# Patient Record
Sex: MALE | Race: WHITE | NOT HISPANIC OR LATINO | Employment: OTHER | URBAN - METROPOLITAN AREA
[De-identification: names, ages, dates, MRNs, and addresses within clinical notes are randomized per-mention and may not be internally consistent; named-entity substitution may affect disease eponyms.]

---

## 2017-08-01 ENCOUNTER — RECORDS - HEALTHEAST (OUTPATIENT)
Dept: LAB | Facility: CLINIC | Age: 67
End: 2017-08-01

## 2017-08-01 LAB
CHOLEST SERPL-MCNC: 162 MG/DL
FASTING STATUS PATIENT QL REPORTED: ABNORMAL
HDLC SERPL-MCNC: 39 MG/DL
LDLC SERPL CALC-MCNC: 75 MG/DL
TRIGL SERPL-MCNC: 240 MG/DL

## 2018-12-21 ENCOUNTER — RECORDS - HEALTHEAST (OUTPATIENT)
Dept: LAB | Facility: CLINIC | Age: 68
End: 2018-12-21

## 2018-12-21 LAB
ALBUMIN SERPL-MCNC: 3.9 G/DL (ref 3.5–5)
ALP SERPL-CCNC: 56 U/L (ref 45–120)
ALT SERPL W P-5'-P-CCNC: 34 U/L (ref 0–45)
ANION GAP SERPL CALCULATED.3IONS-SCNC: 10 MMOL/L (ref 5–18)
AST SERPL W P-5'-P-CCNC: 24 U/L (ref 0–40)
BILIRUB SERPL-MCNC: 0.9 MG/DL (ref 0–1)
BUN SERPL-MCNC: 14 MG/DL (ref 8–22)
CALCIUM SERPL-MCNC: 9.2 MG/DL (ref 8.5–10.5)
CHLORIDE BLD-SCNC: 101 MMOL/L (ref 98–107)
CHOLEST SERPL-MCNC: 221 MG/DL
CO2 SERPL-SCNC: 27 MMOL/L (ref 22–31)
CREAT SERPL-MCNC: 0.96 MG/DL (ref 0.7–1.3)
FASTING STATUS PATIENT QL REPORTED: ABNORMAL
GFR SERPL CREATININE-BSD FRML MDRD: >60 ML/MIN/1.73M2
GLUCOSE BLD-MCNC: 155 MG/DL (ref 70–125)
HDLC SERPL-MCNC: 41 MG/DL
LDLC SERPL CALC-MCNC: 141 MG/DL
POTASSIUM BLD-SCNC: 4.2 MMOL/L (ref 3.5–5)
PROT SERPL-MCNC: 6.6 G/DL (ref 6–8)
SODIUM SERPL-SCNC: 138 MMOL/L (ref 136–145)
TRIGL SERPL-MCNC: 194 MG/DL

## 2019-06-05 ENCOUNTER — RECORDS - HEALTHEAST (OUTPATIENT)
Dept: LAB | Facility: CLINIC | Age: 69
End: 2019-06-05

## 2019-06-05 LAB
ALBUMIN SERPL-MCNC: 3.8 G/DL (ref 3.5–5)
ANION GAP SERPL CALCULATED.3IONS-SCNC: 10 MMOL/L (ref 5–18)
BUN SERPL-MCNC: 15 MG/DL (ref 8–22)
CALCIUM SERPL-MCNC: 9.4 MG/DL (ref 8.5–10.5)
CHLORIDE BLD-SCNC: 101 MMOL/L (ref 98–107)
CHOLEST SERPL-MCNC: 178 MG/DL
CO2 SERPL-SCNC: 26 MMOL/L (ref 22–31)
CREAT SERPL-MCNC: 1.03 MG/DL (ref 0.7–1.3)
FASTING STATUS PATIENT QL REPORTED: ABNORMAL
GFR SERPL CREATININE-BSD FRML MDRD: >60 ML/MIN/1.73M2
GLUCOSE BLD-MCNC: 144 MG/DL (ref 70–125)
HDLC SERPL-MCNC: 42 MG/DL
LDLC SERPL CALC-MCNC: 96 MG/DL
PHOSPHATE SERPL-MCNC: 3.3 MG/DL (ref 2.5–4.5)
POTASSIUM BLD-SCNC: 4.1 MMOL/L (ref 3.5–5)
PSA SERPL-MCNC: 1.2 NG/ML (ref 0–4.5)
SODIUM SERPL-SCNC: 137 MMOL/L (ref 136–145)
TRIGL SERPL-MCNC: 198 MG/DL

## 2020-12-18 ENCOUNTER — RECORDS - HEALTHEAST (OUTPATIENT)
Dept: LAB | Facility: CLINIC | Age: 70
End: 2020-12-18

## 2020-12-18 LAB
ALBUMIN SERPL-MCNC: 3.7 G/DL (ref 3.5–5)
ALP SERPL-CCNC: 60 U/L (ref 45–120)
ALT SERPL W P-5'-P-CCNC: 18 U/L (ref 0–45)
ANION GAP SERPL CALCULATED.3IONS-SCNC: 8 MMOL/L (ref 5–18)
AST SERPL W P-5'-P-CCNC: 20 U/L (ref 0–40)
BILIRUB SERPL-MCNC: 0.7 MG/DL (ref 0–1)
BUN SERPL-MCNC: 10 MG/DL (ref 8–22)
CALCIUM SERPL-MCNC: 8.9 MG/DL (ref 8.5–10.5)
CHLORIDE BLD-SCNC: 101 MMOL/L (ref 98–107)
CHOLEST SERPL-MCNC: 163 MG/DL
CO2 SERPL-SCNC: 29 MMOL/L (ref 22–31)
CREAT SERPL-MCNC: 0.99 MG/DL (ref 0.7–1.3)
FASTING STATUS PATIENT QL REPORTED: ABNORMAL
GFR SERPL CREATININE-BSD FRML MDRD: >60 ML/MIN/1.73M2
GLUCOSE BLD-MCNC: 94 MG/DL (ref 70–125)
HDLC SERPL-MCNC: 39 MG/DL
LDLC SERPL CALC-MCNC: 88 MG/DL
POTASSIUM BLD-SCNC: 4.1 MMOL/L (ref 3.5–5)
PROT SERPL-MCNC: 6.5 G/DL (ref 6–8)
SODIUM SERPL-SCNC: 138 MMOL/L (ref 136–145)
TRIGL SERPL-MCNC: 182 MG/DL

## 2021-05-25 ENCOUNTER — RECORDS - HEALTHEAST (OUTPATIENT)
Dept: ADMINISTRATIVE | Facility: CLINIC | Age: 71
End: 2021-05-25

## 2021-06-01 ENCOUNTER — RECORDS - HEALTHEAST (OUTPATIENT)
Dept: ADMINISTRATIVE | Facility: CLINIC | Age: 71
End: 2021-06-01

## 2021-09-21 ENCOUNTER — LAB REQUISITION (OUTPATIENT)
Dept: LAB | Facility: CLINIC | Age: 71
End: 2021-09-21

## 2021-09-21 DIAGNOSIS — E78.2 MIXED HYPERLIPIDEMIA: ICD-10-CM

## 2021-09-21 DIAGNOSIS — I10 ESSENTIAL (PRIMARY) HYPERTENSION: ICD-10-CM

## 2021-09-21 LAB
ALBUMIN SERPL-MCNC: 3.5 G/DL (ref 3.5–5)
ALP SERPL-CCNC: 77 U/L (ref 45–120)
ALT SERPL W P-5'-P-CCNC: 12 U/L (ref 0–45)
ANION GAP SERPL CALCULATED.3IONS-SCNC: 10 MMOL/L (ref 5–18)
AST SERPL W P-5'-P-CCNC: 12 U/L (ref 0–40)
BILIRUB SERPL-MCNC: 0.7 MG/DL (ref 0–1)
BUN SERPL-MCNC: 14 MG/DL (ref 8–22)
CALCIUM SERPL-MCNC: 8.9 MG/DL (ref 8.5–10.5)
CHLORIDE BLD-SCNC: 101 MMOL/L (ref 98–107)
CHOLEST SERPL-MCNC: 150 MG/DL
CO2 SERPL-SCNC: 26 MMOL/L (ref 22–31)
CREAT SERPL-MCNC: 0.99 MG/DL (ref 0.7–1.3)
GFR SERPL CREATININE-BSD FRML MDRD: 77 ML/MIN/1.73M2
GLUCOSE BLD-MCNC: 201 MG/DL (ref 70–125)
HDLC SERPL-MCNC: 38 MG/DL
LDLC SERPL CALC-MCNC: 79 MG/DL
POTASSIUM BLD-SCNC: 4 MMOL/L (ref 3.5–5)
PROT SERPL-MCNC: 6.2 G/DL (ref 6–8)
SODIUM SERPL-SCNC: 137 MMOL/L (ref 136–145)
TRIGL SERPL-MCNC: 166 MG/DL

## 2021-09-21 PROCEDURE — 82040 ASSAY OF SERUM ALBUMIN: CPT | Performed by: PHYSICIAN ASSISTANT

## 2021-09-21 PROCEDURE — 80061 LIPID PANEL: CPT | Performed by: PHYSICIAN ASSISTANT

## 2023-03-20 ENCOUNTER — LAB REQUISITION (OUTPATIENT)
Dept: LAB | Facility: CLINIC | Age: 73
End: 2023-03-20

## 2023-03-20 DIAGNOSIS — E78.2 MIXED HYPERLIPIDEMIA: ICD-10-CM

## 2023-03-20 LAB
ALBUMIN SERPL BCG-MCNC: 3.5 G/DL (ref 3.5–5.2)
ALP SERPL-CCNC: 71 U/L (ref 40–129)
ALT SERPL W P-5'-P-CCNC: 8 U/L (ref 10–50)
ANION GAP SERPL CALCULATED.3IONS-SCNC: 11 MMOL/L (ref 7–15)
AST SERPL W P-5'-P-CCNC: 12 U/L (ref 10–50)
BILIRUB SERPL-MCNC: 0.4 MG/DL
BUN SERPL-MCNC: 16.4 MG/DL (ref 8–23)
CALCIUM SERPL-MCNC: 8.1 MG/DL (ref 8.8–10.2)
CHLORIDE SERPL-SCNC: 102 MMOL/L (ref 98–107)
CHOLEST SERPL-MCNC: 119 MG/DL
CREAT SERPL-MCNC: 1.36 MG/DL (ref 0.67–1.17)
DEPRECATED HCO3 PLAS-SCNC: 26 MMOL/L (ref 22–29)
GFR SERPL CREATININE-BSD FRML MDRD: 56 ML/MIN/1.73M2
GLUCOSE SERPL-MCNC: 129 MG/DL (ref 70–99)
HDLC SERPL-MCNC: 32 MG/DL
LDLC SERPL CALC-MCNC: 66 MG/DL
NONHDLC SERPL-MCNC: 87 MG/DL
POTASSIUM SERPL-SCNC: 4 MMOL/L (ref 3.4–5.3)
PROT SERPL-MCNC: 5.7 G/DL (ref 6.4–8.3)
SODIUM SERPL-SCNC: 139 MMOL/L (ref 136–145)
TRIGL SERPL-MCNC: 106 MG/DL

## 2023-03-20 PROCEDURE — 80061 LIPID PANEL: CPT | Performed by: PHYSICIAN ASSISTANT

## 2023-03-20 PROCEDURE — 80053 COMPREHEN METABOLIC PANEL: CPT | Performed by: PHYSICIAN ASSISTANT

## 2023-04-05 ENCOUNTER — LAB REQUISITION (OUTPATIENT)
Dept: LAB | Facility: CLINIC | Age: 73
End: 2023-04-05

## 2023-04-05 DIAGNOSIS — E83.51 HYPOCALCEMIA: ICD-10-CM

## 2023-04-05 LAB
MAGNESIUM SERPL-MCNC: 1.4 MG/DL (ref 1.7–2.3)
PHOSPHATE SERPL-MCNC: 3.7 MG/DL (ref 2.5–4.5)
PTH-INTACT SERPL-MCNC: 35 PG/ML (ref 15–65)
TSH SERPL DL<=0.005 MIU/L-ACNC: 3.94 UIU/ML (ref 0.3–4.2)

## 2023-04-05 PROCEDURE — 84443 ASSAY THYROID STIM HORMONE: CPT | Performed by: PHYSICIAN ASSISTANT

## 2023-04-05 PROCEDURE — 82306 VITAMIN D 25 HYDROXY: CPT | Performed by: PHYSICIAN ASSISTANT

## 2023-04-05 PROCEDURE — 83970 ASSAY OF PARATHORMONE: CPT | Performed by: PHYSICIAN ASSISTANT

## 2023-04-05 PROCEDURE — 84100 ASSAY OF PHOSPHORUS: CPT | Performed by: PHYSICIAN ASSISTANT

## 2023-04-05 PROCEDURE — 83735 ASSAY OF MAGNESIUM: CPT | Performed by: PHYSICIAN ASSISTANT

## 2023-04-06 LAB — DEPRECATED CALCIDIOL+CALCIFEROL SERPL-MC: 11 UG/L (ref 20–75)

## 2023-08-24 ENCOUNTER — LAB REQUISITION (OUTPATIENT)
Dept: LAB | Facility: CLINIC | Age: 73
End: 2023-08-24

## 2023-08-24 ENCOUNTER — TRANSFERRED RECORDS (OUTPATIENT)
Dept: HEALTH INFORMATION MANAGEMENT | Facility: CLINIC | Age: 73
End: 2023-08-24
Payer: COMMERCIAL

## 2023-08-24 DIAGNOSIS — C91.41 HAIRY CELL LEUKEMIA, IN REMISSION (H): ICD-10-CM

## 2023-08-24 DIAGNOSIS — R10.11 RIGHT UPPER QUADRANT PAIN: ICD-10-CM

## 2023-08-24 LAB
ALBUMIN SERPL BCG-MCNC: 3.4 G/DL (ref 3.5–5.2)
ALP SERPL-CCNC: 64 U/L (ref 40–129)
ALT SERPL W P-5'-P-CCNC: 6 U/L (ref 0–70)
ANION GAP SERPL CALCULATED.3IONS-SCNC: 13 MMOL/L (ref 7–15)
AST SERPL W P-5'-P-CCNC: 13 U/L (ref 0–45)
BASOPHILS # BLD AUTO: 0 10E3/UL (ref 0–0.2)
BASOPHILS NFR BLD AUTO: 0 %
BILIRUB SERPL-MCNC: 0.6 MG/DL
BUN SERPL-MCNC: 34.4 MG/DL (ref 8–23)
CALCIUM SERPL-MCNC: 8 MG/DL (ref 8.8–10.2)
CHLORIDE SERPL-SCNC: 98 MMOL/L (ref 98–107)
CREAT SERPL-MCNC: 1.32 MG/DL (ref 0.67–1.17)
DEPRECATED HCO3 PLAS-SCNC: 24 MMOL/L (ref 22–29)
EOSINOPHIL # BLD AUTO: 0.1 10E3/UL (ref 0–0.7)
EOSINOPHIL NFR BLD AUTO: 1 %
ERYTHROCYTE [DISTWIDTH] IN BLOOD BY AUTOMATED COUNT: 18.9 % (ref 10–15)
GFR SERPL CREATININE-BSD FRML MDRD: 58 ML/MIN/1.73M2
GGT SERPL-CCNC: 9 U/L (ref 8–61)
GLUCOSE SERPL-MCNC: 120 MG/DL (ref 70–99)
HCT VFR BLD AUTO: 39.7 % (ref 40–53)
HGB BLD-MCNC: 11.8 G/DL (ref 13.3–17.7)
IMM GRANULOCYTES # BLD: 0 10E3/UL
IMM GRANULOCYTES NFR BLD: 1 %
LIPASE SERPL-CCNC: 20 U/L (ref 13–60)
LYMPHOCYTES # BLD AUTO: 1.4 10E3/UL (ref 0.8–5.3)
LYMPHOCYTES NFR BLD AUTO: 19 %
MCH RBC QN AUTO: 20 PG (ref 26.5–33)
MCHC RBC AUTO-ENTMCNC: 29.7 G/DL (ref 31.5–36.5)
MCV RBC AUTO: 67 FL (ref 78–100)
MONOCYTES # BLD AUTO: 1.3 10E3/UL (ref 0–1.3)
MONOCYTES NFR BLD AUTO: 17 %
NEUTROPHILS # BLD AUTO: 4.5 10E3/UL (ref 1.6–8.3)
NEUTROPHILS NFR BLD AUTO: 62 %
NRBC # BLD AUTO: 0 10E3/UL
NRBC BLD AUTO-RTO: 0 /100
PLATELET # BLD AUTO: 374 10E3/UL (ref 150–450)
POTASSIUM SERPL-SCNC: 3.7 MMOL/L (ref 3.4–5.3)
PROT SERPL-MCNC: 5.7 G/DL (ref 6.4–8.3)
RBC # BLD AUTO: 5.91 10E6/UL (ref 4.4–5.9)
SODIUM SERPL-SCNC: 135 MMOL/L (ref 136–145)
WBC # BLD AUTO: 7.2 10E3/UL (ref 4–11)

## 2023-08-24 PROCEDURE — 85025 COMPLETE CBC W/AUTO DIFF WBC: CPT | Performed by: FAMILY MEDICINE

## 2023-08-24 PROCEDURE — 83690 ASSAY OF LIPASE: CPT | Performed by: FAMILY MEDICINE

## 2023-08-24 PROCEDURE — 80053 COMPREHEN METABOLIC PANEL: CPT | Performed by: FAMILY MEDICINE

## 2023-08-24 PROCEDURE — 82977 ASSAY OF GGT: CPT | Performed by: FAMILY MEDICINE

## 2023-08-29 ENCOUNTER — TRANSFERRED RECORDS (OUTPATIENT)
Dept: HEALTH INFORMATION MANAGEMENT | Facility: CLINIC | Age: 73
End: 2023-08-29
Payer: COMMERCIAL

## 2023-08-29 ENCOUNTER — MEDICAL CORRESPONDENCE (OUTPATIENT)
Dept: HEALTH INFORMATION MANAGEMENT | Facility: CLINIC | Age: 73
End: 2023-08-29
Payer: COMMERCIAL

## 2023-08-29 ENCOUNTER — ANCILLARY PROCEDURE (OUTPATIENT)
Dept: RADIOLOGY | Facility: CLINIC | Age: 73
End: 2023-08-29
Payer: COMMERCIAL

## 2023-08-29 ENCOUNTER — TELEPHONE (OUTPATIENT)
Dept: SURGERY | Facility: CLINIC | Age: 73
End: 2023-08-29
Payer: COMMERCIAL

## 2023-08-29 NOTE — TELEPHONE ENCOUNTER
Patient's spouse Kathy called and would like to speak to Dr Howard specifically. Alok had surgery with Dr Howard several years ago. She said that Alok was recently diagnosed with colon cancer and he will need surgery. His clinic will be sending a referral but she would like to talk to Dr Howard first. She can be reached at [phone].

## 2023-08-30 ENCOUNTER — TRANSCRIBE ORDERS (OUTPATIENT)
Dept: OTHER | Age: 73
End: 2023-08-30

## 2023-08-30 DIAGNOSIS — K63.89 MASS OF COLON: Primary | ICD-10-CM

## 2023-08-30 DIAGNOSIS — K56.690 OTHER PARTIAL INTESTINAL OBSTRUCTION (H): ICD-10-CM

## 2023-08-31 ENCOUNTER — OFFICE VISIT (OUTPATIENT)
Dept: SURGERY | Facility: CLINIC | Age: 73
End: 2023-08-31
Payer: COMMERCIAL

## 2023-08-31 VITALS — WEIGHT: 170 LBS | HEIGHT: 69 IN | BODY MASS INDEX: 25.18 KG/M2

## 2023-08-31 DIAGNOSIS — K56.690 OTHER PARTIAL INTESTINAL OBSTRUCTION (H): ICD-10-CM

## 2023-08-31 DIAGNOSIS — K63.89 MASS OF COLON: ICD-10-CM

## 2023-08-31 PROCEDURE — 99203 OFFICE O/P NEW LOW 30 MIN: CPT | Performed by: SURGERY

## 2023-08-31 RX ORDER — GLIPIZIDE 10 MG/1
TABLET, FILM COATED, EXTENDED RELEASE ORAL
COMMUNITY
End: 2023-09-01

## 2023-08-31 RX ORDER — PRAVASTATIN SODIUM 20 MG
TABLET ORAL
COMMUNITY
Start: 2023-06-16

## 2023-08-31 RX ORDER — ATENOLOL 50 MG/1
50 TABLET ORAL DAILY
COMMUNITY

## 2023-08-31 RX ORDER — ONDANSETRON 8 MG/1
TABLET, FILM COATED ORAL
COMMUNITY
Start: 2023-08-24 | End: 2023-09-01

## 2023-08-31 RX ORDER — CEFAZOLIN SODIUM/WATER 2 G/20 ML
2 SYRINGE (ML) INTRAVENOUS SEE ADMIN INSTRUCTIONS
Status: CANCELLED | OUTPATIENT
Start: 2023-09-05

## 2023-08-31 RX ORDER — CEFAZOLIN SODIUM/WATER 2 G/20 ML
2 SYRINGE (ML) INTRAVENOUS
Status: CANCELLED | OUTPATIENT
Start: 2023-09-05

## 2023-08-31 RX ORDER — AMLODIPINE BESYLATE 10 MG/1
10 TABLET ORAL DAILY
Status: ON HOLD | COMMUNITY
End: 2023-09-08

## 2023-08-31 RX ORDER — LISINOPRIL 30 MG/1
30 TABLET ORAL DAILY
COMMUNITY
Start: 2023-01-06

## 2023-08-31 NOTE — PROGRESS NOTES
HPI: Alok Mcfarland is a 72 year old male referred to see me by Aleena Mahmood for a sigmoid colon mass, near obstructing.  He has noted unintentional weight loss for the past year with diarrhea as well.  No bone pain but has had abdominal bloating for several months. No previous colonoscopy. Denies any hematochezia or black tarry stool.     Allergies:Patient has no known allergies.    No past medical history on file.    Past Surgical History:   Procedure Laterality Date    IR MISCELLANEOUS PROCEDURE  10/18/2000       CURRENT MEDS:    Current Outpatient Medications:     amLODIPine (NORVASC) 10 MG tablet, Take 1 tablet by mouth daily, Disp: , Rfl:     atenolol (TENORMIN) 50 MG tablet, 1 tablet Orally Once a day, Disp: , Rfl:     blood glucose (NO BRAND SPECIFIED) test strip, once daily . E11.9, one touch ultra 2 for 90 days, Disp: , Rfl:     glipiZIDE (GLUCOTROL XL) 10 MG 24 hr tablet, 1 tab(s) orally once a day, Disp: , Rfl:     lisinopril (ZESTRIL) 20 MG tablet, Take 1 tablet by mouth daily at 2 pm, Disp: , Rfl:     metFORMIN (GLUCOPHAGE) 500 MG tablet, 2 tabs orally twice a day, Disp: , Rfl:     ondansetron (ZOFRAN) 8 MG tablet, 1 tablet as needed Orally three times per day, Disp: , Rfl:     pravastatin (PRAVACHOL) 20 MG tablet, TAKE ONE TABLET BY MOUTH EVERY NIGHT AT BEDTIME*, Disp: , Rfl:     No family history on file.         Review of Systems:  The 12 system review is within normal limits except for as mentioned above.  General ROS: No complaints or constitutional symptoms  Ophthalmic ROS: No complaints of visual changes  Skin: No complaints or symptoms   Endocrine: No complaints or symptoms  Hematologic/Lymphatic: No symptoms or complaints  Psychiatric: No symptoms or complaints  Respiratory ROS: no cough, shortness of breath, or wheezing  Cardiovascular ROS: no chest pain or dyspnea on exertion  Gastrointestinal ROS: As per HPI  Genito-Urinary ROS: no dysuria, trouble voiding, or  "hematuria  Musculoskeletal ROS: no joint or muscle pain  Neurological ROS: no TIA or stroke symptoms      EXAM:  Ht 1.753 m (5' 9\")   Wt 77.1 kg (170 lb)   BMI 25.10 kg/m    GENERAL: Well developed male, No acute distress, pleasant and conversant   EYES: Pupils equal, round and reactive, no scleral icterus  ABDOMEN: distended, palpable left lower quadrant mass, non tender  SKIN: Pink, warm and dry, no obvious rashes or lesions   NEURO:No focal deficits, ambulatory  MUSCULOSKELETAL:No obvious deformities, no swelling, normal appearing      LABS:  Lab Results   Component Value Date    WBC 7.2 08/24/2023    HGB 11.8 08/24/2023    HCT 39.7 08/24/2023    MCV 67 08/24/2023     08/24/2023     INR/Prothrombin Time  @LABRCNTIP(NA,K,CL,co2,bun,creatinine,labglom,glucose,calcium)@  Lab Results   Component Value Date    ALT 6 08/24/2023    AST 13 08/24/2023    GGT 9 08/24/2023    ALKPHOS 64 08/24/2023       IMAGES:   Relevant images were reviewed and discussed with the patient.  Notable findings were: ct images demonstrating a near complete obstructing sigmoid mass with severely distended colon.  No other lesions noted.     Assessment/Plan:   Alok Mcfarland is a 72 year old male with signs and symptoms consistent with a colon mass concerning for malignancy. .  I have explained the pathophysiology of colon cancer in detail as well as the surgical versus non-operative management strategies.      The risks of surgery were discussed in detail which include, but are not limited to, anastamotic leak, bleeding, infection, injury to surrounding structures, the need to convert to an open procedure, blood clots, stroke, heart attack and death.  Additionally, the risks of non operative management were discussed which include, but are not limited to, worsening distention, increased pain, sepsis and death.     He understands everything which was discussed and has consented to proceed with a laparoscopic sigmoidectomy with a " bull chance of conversion to open procedure. .  We will schedule surgery accordingly.       Thor Fall,  [facility]  [phone]  [facility] Department of Surgery

## 2023-08-31 NOTE — LETTER
8/31/2023         RE: Alok Mcfarland  [address] [place] [facility] [address]        Dear Colleague,    Thank you for referring your patient, Alok Mcfarland, to the [facility] [place]. Please see a copy of my visit note below.    HPI: Alok Mcfarland is a 72 year old male referred to see me by Aleena Mahmood for a sigmoid colon mass, near obstructing.  He has noted unintentional weight loss for the past year with diarrhea as well.  No bone pain but has had abdominal bloating for several months. No previous colonoscopy. Denies any hematochezia or black tarry stool.     Allergies:Patient has no known allergies.    No past medical history on file.    Past Surgical History:   Procedure Laterality Date     IR MISCELLANEOUS PROCEDURE  10/18/2000       CURRENT MEDS:    Current Outpatient Medications:      amLODIPine (NORVASC) 10 MG tablet, Take 1 tablet by mouth daily, Disp: , Rfl:      atenolol (TENORMIN) 50 MG tablet, 1 tablet Orally Once a day, Disp: , Rfl:      blood glucose (NO BRAND SPECIFIED) test strip, once daily . E11.9, one touch ultra 2 for 90 days, Disp: , Rfl:      glipiZIDE (GLUCOTROL XL) 10 MG 24 hr tablet, 1 tab(s) orally once a day, Disp: , Rfl:      lisinopril (ZESTRIL) 20 MG tablet, Take 1 tablet by mouth daily at 2 pm, Disp: , Rfl:      metFORMIN (GLUCOPHAGE) 500 MG tablet, 2 tabs orally twice a day, Disp: , Rfl:      ondansetron (ZOFRAN) 8 MG tablet, 1 tablet as needed Orally three times per day, Disp: , Rfl:      pravastatin (PRAVACHOL) 20 MG tablet, TAKE ONE TABLET BY MOUTH EVERY NIGHT AT BEDTIME*, Disp: , Rfl:     No family history on file.         Review of Systems:  The 12 system review is within normal limits except for as mentioned above.  General ROS: No complaints or constitutional symptoms  Ophthalmic ROS: No complaints of visual changes  Skin: No complaints or symptoms   Endocrine: No complaints or symptoms  Hematologic/Lymphatic: No  "symptoms or complaints  Psychiatric: No symptoms or complaints  Respiratory ROS: no cough, shortness of breath, or wheezing  Cardiovascular ROS: no chest pain or dyspnea on exertion  Gastrointestinal ROS: As per HPI  Genito-Urinary ROS: no dysuria, trouble voiding, or hematuria  Musculoskeletal ROS: no joint or muscle pain  Neurological ROS: no TIA or stroke symptoms      EXAM:  Ht 1.753 m (5' 9\")   Wt 77.1 kg (170 lb)   BMI 25.10 kg/m    GENERAL: Well developed male, No acute distress, pleasant and conversant   EYES: Pupils equal, round and reactive, no scleral icterus  ABDOMEN: distended, palpable left lower quadrant mass, non tender  SKIN: Pink, warm and dry, no obvious rashes or lesions   NEURO:No focal deficits, ambulatory  MUSCULOSKELETAL:No obvious deformities, no swelling, normal appearing      LABS:  Lab Results   Component Value Date    WBC 7.2 08/24/2023    HGB 11.8 08/24/2023    HCT 39.7 08/24/2023    MCV 67 08/24/2023     08/24/2023     INR/Prothrombin Time  @LABRCNTIP(NA,K,CL,co2,bun,creatinine,labglom,glucose,calcium)@  Lab Results   Component Value Date    ALT 6 08/24/2023    AST 13 08/24/2023    GGT 9 08/24/2023    ALKPHOS 64 08/24/2023       IMAGES:   Relevant images were reviewed and discussed with the patient.  Notable findings were: ct images demonstrating a near complete obstructing sigmoid mass with severely distended colon.  No other lesions noted.     Assessment/Plan:   Alok Mcfarland is a 72 year old male with signs and symptoms consistent with a colon mass concerning for malignancy. .  I have explained the pathophysiology of colon cancer in detail as well as the surgical versus non-operative management strategies.      The risks of surgery were discussed in detail which include, but are not limited to, anastamotic leak, bleeding, infection, injury to surrounding structures, the need to convert to an open procedure, blood clots, stroke, heart attack and death.  Additionally, the " risks of non operative management were discussed which include, but are not limited to, worsening distention, increased pain, sepsis and death.     He understands everything which was discussed and has consented to proceed with a laparoscopic sigmoidectomy with a bull chance of conversion to open procedure. .  We will schedule surgery accordingly.       Thor Fall DO Located within [facility]  [phone]  [facility] Department of Surgery      Again, thank you for allowing me to participate in the care of your patient.        Sincerely,        Thor Fall DO

## 2023-08-31 NOTE — H&P (VIEW-ONLY)
HPI: Alok Mcfarland is a 72 year old male referred to see me by Aleena Mahmood for a sigmoid colon mass, near obstructing.  He has noted unintentional weight loss for the past year with diarrhea as well.  No bone pain but has had abdominal bloating for several months. No previous colonoscopy. Denies any hematochezia or black tarry stool.     Allergies:Patient has no known allergies.    No past medical history on file.    Past Surgical History:   Procedure Laterality Date    IR MISCELLANEOUS PROCEDURE  10/18/2000       CURRENT MEDS:    Current Outpatient Medications:     amLODIPine (NORVASC) 10 MG tablet, Take 1 tablet by mouth daily, Disp: , Rfl:     atenolol (TENORMIN) 50 MG tablet, 1 tablet Orally Once a day, Disp: , Rfl:     blood glucose (NO BRAND SPECIFIED) test strip, once daily . E11.9, one touch ultra 2 for 90 days, Disp: , Rfl:     glipiZIDE (GLUCOTROL XL) 10 MG 24 hr tablet, 1 tab(s) orally once a day, Disp: , Rfl:     lisinopril (ZESTRIL) 20 MG tablet, Take 1 tablet by mouth daily at 2 pm, Disp: , Rfl:     metFORMIN (GLUCOPHAGE) 500 MG tablet, 2 tabs orally twice a day, Disp: , Rfl:     ondansetron (ZOFRAN) 8 MG tablet, 1 tablet as needed Orally three times per day, Disp: , Rfl:     pravastatin (PRAVACHOL) 20 MG tablet, TAKE ONE TABLET BY MOUTH EVERY NIGHT AT BEDTIME*, Disp: , Rfl:     No family history on file.         Review of Systems:  The 12 system review is within normal limits except for as mentioned above.  General ROS: No complaints or constitutional symptoms  Ophthalmic ROS: No complaints of visual changes  Skin: No complaints or symptoms   Endocrine: No complaints or symptoms  Hematologic/Lymphatic: No symptoms or complaints  Psychiatric: No symptoms or complaints  Respiratory ROS: no cough, shortness of breath, or wheezing  Cardiovascular ROS: no chest pain or dyspnea on exertion  Gastrointestinal ROS: As per HPI  Genito-Urinary ROS: no dysuria, trouble voiding, or  "hematuria  Musculoskeletal ROS: no joint or muscle pain  Neurological ROS: no TIA or stroke symptoms      EXAM:  Ht 1.753 m (5' 9\")   Wt 77.1 kg (170 lb)   BMI 25.10 kg/m    GENERAL: Well developed male, No acute distress, pleasant and conversant   EYES: Pupils equal, round and reactive, no scleral icterus  ABDOMEN: distended, palpable left lower quadrant mass, non tender  SKIN: Pink, warm and dry, no obvious rashes or lesions   NEURO:No focal deficits, ambulatory  MUSCULOSKELETAL:No obvious deformities, no swelling, normal appearing      LABS:  Lab Results   Component Value Date    WBC 7.2 08/24/2023    HGB 11.8 08/24/2023    HCT 39.7 08/24/2023    MCV 67 08/24/2023     08/24/2023     INR/Prothrombin Time  @LABRCNTIP(NA,K,CL,co2,bun,creatinine,labglom,glucose,calcium)@  Lab Results   Component Value Date    ALT 6 08/24/2023    AST 13 08/24/2023    GGT 9 08/24/2023    ALKPHOS 64 08/24/2023       IMAGES:   Relevant images were reviewed and discussed with the patient.  Notable findings were: ct images demonstrating a near complete obstructing sigmoid mass with severely distended colon.  No other lesions noted.     Assessment/Plan:   Alok Mcfarland is a 72 year old male with signs and symptoms consistent with a colon mass concerning for malignancy. .  I have explained the pathophysiology of colon cancer in detail as well as the surgical versus non-operative management strategies.      The risks of surgery were discussed in detail which include, but are not limited to, anastamotic leak, bleeding, infection, injury to surrounding structures, the need to convert to an open procedure, blood clots, stroke, heart attack and death.  Additionally, the risks of non operative management were discussed which include, but are not limited to, worsening distention, increased pain, sepsis and death.     He understands everything which was discussed and has consented to proceed with a laparoscopic sigmoidectomy with a " bull chance of conversion to open procedure. .  We will schedule surgery accordingly.       Thor Fall,  [facility]  [phone]  [facility] Department of Surgery

## 2023-09-01 ASSESSMENT — ACTIVITIES OF DAILY LIVING (ADL)
WEAR_GLASSES_OR_BLIND: NO
WALKING_OR_CLIMBING_STAIRS_DIFFICULTY: NO
DOING_ERRANDS_INDEPENDENTLY_DIFFICULTY: NO
TOILETING_ISSUES: NO
CONCENTRATING,_REMEMBERING_OR_MAKING_DECISIONS_DIFFICULTY: NO
DRESSING/BATHING_DIFFICULTY: NO
FALL_HISTORY_WITHIN_LAST_SIX_MONTHS: NO
CHANGE_IN_FUNCTIONAL_STATUS_SINCE_ONSET_OF_CURRENT_ILLNESS/INJURY: NO
DIFFICULTY_EATING/SWALLOWING: NO

## 2023-09-05 ENCOUNTER — HOSPITAL ENCOUNTER (INPATIENT)
Facility: HOSPITAL | Age: 73
LOS: 3 days | Discharge: HOME HEALTH | DRG: 330 | End: 2023-09-08
Attending: SURGERY | Admitting: SURGERY
Payer: COMMERCIAL

## 2023-09-05 ENCOUNTER — ANESTHESIA EVENT (OUTPATIENT)
Dept: SURGERY | Facility: HOSPITAL | Age: 73
DRG: 330 | End: 2023-09-05
Payer: COMMERCIAL

## 2023-09-05 ENCOUNTER — ANESTHESIA (OUTPATIENT)
Dept: SURGERY | Facility: HOSPITAL | Age: 73
DRG: 330 | End: 2023-09-05
Payer: COMMERCIAL

## 2023-09-05 DIAGNOSIS — Z90.49 S/P PARTIAL COLECTOMY: ICD-10-CM

## 2023-09-05 DIAGNOSIS — K63.89 COLONIC MASS: ICD-10-CM

## 2023-09-05 DIAGNOSIS — I10 ESSENTIAL HYPERTENSION, BENIGN: ICD-10-CM

## 2023-09-05 DIAGNOSIS — Z90.49 H/O COLECTOMY: ICD-10-CM

## 2023-09-05 DIAGNOSIS — Z43.3 ATTENTION TO COLOSTOMY (H): Primary | ICD-10-CM

## 2023-09-05 LAB
CEA SERPL-MCNC: 1.1 NG/ML
GLUCOSE BLDC GLUCOMTR-MCNC: 109 MG/DL (ref 70–99)
GLUCOSE BLDC GLUCOMTR-MCNC: 113 MG/DL (ref 70–99)
GLUCOSE BLDC GLUCOMTR-MCNC: 143 MG/DL (ref 70–99)
HGB BLD-MCNC: 9.5 G/DL (ref 13.3–17.7)

## 2023-09-05 PROCEDURE — 0D1N0Z4 BYPASS SIGMOID COLON TO CUTANEOUS, OPEN APPROACH: ICD-10-PCS | Performed by: SURGERY

## 2023-09-05 PROCEDURE — 250N000011 HC RX IP 250 OP 636: Performed by: NURSE ANESTHETIST, CERTIFIED REGISTERED

## 2023-09-05 PROCEDURE — 258N000003 HC RX IP 258 OP 636: Performed by: STUDENT IN AN ORGANIZED HEALTH CARE EDUCATION/TRAINING PROGRAM

## 2023-09-05 PROCEDURE — 250N000011 HC RX IP 250 OP 636

## 2023-09-05 PROCEDURE — 250N000011 HC RX IP 250 OP 636: Mod: JZ | Performed by: NURSE ANESTHETIST, CERTIFIED REGISTERED

## 2023-09-05 PROCEDURE — 85018 HEMOGLOBIN: CPT | Performed by: INTERNAL MEDICINE

## 2023-09-05 PROCEDURE — 88342 IMHCHEM/IMCYTCHM 1ST ANTB: CPT | Mod: TC | Performed by: SURGERY

## 2023-09-05 PROCEDURE — 250N000013 HC RX MED GY IP 250 OP 250 PS 637: Performed by: STUDENT IN AN ORGANIZED HEALTH CARE EDUCATION/TRAINING PROGRAM

## 2023-09-05 PROCEDURE — 250N000025 HC SEVOFLURANE, PER MIN: Performed by: SURGERY

## 2023-09-05 PROCEDURE — 99233 SBSQ HOSP IP/OBS HIGH 50: CPT | Performed by: INTERNAL MEDICINE

## 2023-09-05 PROCEDURE — 272N000001 HC OR GENERAL SUPPLY STERILE: Performed by: SURGERY

## 2023-09-05 PROCEDURE — 250N000011 HC RX IP 250 OP 636: Performed by: SURGERY

## 2023-09-05 PROCEDURE — 120N000001 HC R&B MED SURG/OB

## 2023-09-05 PROCEDURE — 0DBN0ZZ EXCISION OF SIGMOID COLON, OPEN APPROACH: ICD-10-PCS | Performed by: SURGERY

## 2023-09-05 PROCEDURE — 250N000009 HC RX 250

## 2023-09-05 PROCEDURE — 258N000003 HC RX IP 258 OP 636: Performed by: NURSE ANESTHETIST, CERTIFIED REGISTERED

## 2023-09-05 PROCEDURE — 258N000003 HC RX IP 258 OP 636: Performed by: INTERNAL MEDICINE

## 2023-09-05 PROCEDURE — 44141 PARTIAL REMOVAL OF COLON: CPT | Performed by: SURGERY

## 2023-09-05 PROCEDURE — 250N000009 HC RX 250: Performed by: NURSE ANESTHETIST, CERTIFIED REGISTERED

## 2023-09-05 PROCEDURE — 999N000141 HC STATISTIC PRE-PROCEDURE NURSING ASSESSMENT: Performed by: SURGERY

## 2023-09-05 PROCEDURE — 370N000017 HC ANESTHESIA TECHNICAL FEE, PER MIN: Performed by: SURGERY

## 2023-09-05 PROCEDURE — 250N000013 HC RX MED GY IP 250 OP 250 PS 637: Performed by: SURGERY

## 2023-09-05 PROCEDURE — 82378 CARCINOEMBRYONIC ANTIGEN: CPT | Performed by: SURGERY

## 2023-09-05 PROCEDURE — 36415 COLL VENOUS BLD VENIPUNCTURE: CPT | Performed by: INTERNAL MEDICINE

## 2023-09-05 PROCEDURE — 0DJW4ZZ INSPECTION OF PERITONEUM, PERCUTANEOUS ENDOSCOPIC APPROACH: ICD-10-PCS | Performed by: SURGERY

## 2023-09-05 PROCEDURE — 88305 TISSUE EXAM BY PATHOLOGIST: CPT | Mod: TC | Performed by: SURGERY

## 2023-09-05 PROCEDURE — 36415 COLL VENOUS BLD VENIPUNCTURE: CPT | Performed by: SURGERY

## 2023-09-05 PROCEDURE — 360N000077 HC SURGERY LEVEL 4, PER MIN: Performed by: SURGERY

## 2023-09-05 PROCEDURE — 250N000009 HC RX 250: Performed by: SURGERY

## 2023-09-05 PROCEDURE — 258N000003 HC RX IP 258 OP 636

## 2023-09-05 PROCEDURE — 710N000009 HC RECOVERY PHASE 1, LEVEL 1, PER MIN: Performed by: SURGERY

## 2023-09-05 RX ORDER — NALOXONE HYDROCHLORIDE 0.4 MG/ML
0.4 INJECTION, SOLUTION INTRAMUSCULAR; INTRAVENOUS; SUBCUTANEOUS
Status: DISCONTINUED | OUTPATIENT
Start: 2023-09-05 | End: 2023-09-08 | Stop reason: HOSPADM

## 2023-09-05 RX ORDER — PROCHLORPERAZINE MALEATE 5 MG
5 TABLET ORAL EVERY 6 HOURS PRN
Status: DISCONTINUED | OUTPATIENT
Start: 2023-09-05 | End: 2023-09-08 | Stop reason: HOSPADM

## 2023-09-05 RX ORDER — DEXAMETHASONE SODIUM PHOSPHATE 10 MG/ML
INJECTION, SOLUTION INTRAMUSCULAR; INTRAVENOUS PRN
Status: DISCONTINUED | OUTPATIENT
Start: 2023-09-05 | End: 2023-09-05

## 2023-09-05 RX ORDER — LIDOCAINE 40 MG/G
CREAM TOPICAL
Status: DISCONTINUED | OUTPATIENT
Start: 2023-09-05 | End: 2023-09-08 | Stop reason: HOSPADM

## 2023-09-05 RX ORDER — ACETAMINOPHEN 325 MG/1
975 TABLET ORAL EVERY 8 HOURS
Status: COMPLETED | OUTPATIENT
Start: 2023-09-05 | End: 2023-09-08

## 2023-09-05 RX ORDER — POLYETHYLENE GLYCOL 3350 17 G/17G
17 POWDER, FOR SOLUTION ORAL DAILY
Status: DISCONTINUED | OUTPATIENT
Start: 2023-09-06 | End: 2023-09-08 | Stop reason: HOSPADM

## 2023-09-05 RX ORDER — ONDANSETRON 2 MG/ML
4 INJECTION INTRAMUSCULAR; INTRAVENOUS EVERY 6 HOURS PRN
Status: DISCONTINUED | OUTPATIENT
Start: 2023-09-05 | End: 2023-09-08 | Stop reason: HOSPADM

## 2023-09-05 RX ORDER — BUPIVACAINE HYDROCHLORIDE 2.5 MG/ML
INJECTION, SOLUTION INFILTRATION; PERINEURAL PRN
Status: DISCONTINUED | OUTPATIENT
Start: 2023-09-05 | End: 2023-09-05 | Stop reason: HOSPADM

## 2023-09-05 RX ORDER — ONDANSETRON 4 MG/1
4 TABLET, ORALLY DISINTEGRATING ORAL EVERY 30 MIN PRN
Status: DISCONTINUED | OUTPATIENT
Start: 2023-09-05 | End: 2023-09-05 | Stop reason: HOSPADM

## 2023-09-05 RX ORDER — ONDANSETRON 2 MG/ML
4 INJECTION INTRAMUSCULAR; INTRAVENOUS EVERY 30 MIN PRN
Status: DISCONTINUED | OUTPATIENT
Start: 2023-09-05 | End: 2023-09-05 | Stop reason: HOSPADM

## 2023-09-05 RX ORDER — PROPOFOL 10 MG/ML
INJECTION, EMULSION INTRAVENOUS PRN
Status: DISCONTINUED | OUTPATIENT
Start: 2023-09-05 | End: 2023-09-05

## 2023-09-05 RX ORDER — FENTANYL CITRATE 50 UG/ML
25 INJECTION, SOLUTION INTRAMUSCULAR; INTRAVENOUS EVERY 5 MIN PRN
Status: DISCONTINUED | OUTPATIENT
Start: 2023-09-05 | End: 2023-09-05 | Stop reason: HOSPADM

## 2023-09-05 RX ORDER — ACETAMINOPHEN 325 MG/1
650 TABLET ORAL EVERY 4 HOURS PRN
Status: DISCONTINUED | OUTPATIENT
Start: 2023-09-08 | End: 2023-09-07

## 2023-09-05 RX ORDER — ENOXAPARIN SODIUM 100 MG/ML
40 INJECTION SUBCUTANEOUS EVERY 24 HOURS
Status: DISCONTINUED | OUTPATIENT
Start: 2023-09-06 | End: 2023-09-08 | Stop reason: HOSPADM

## 2023-09-05 RX ORDER — ONDANSETRON 4 MG/1
8 TABLET, FILM COATED ORAL 3 TIMES DAILY PRN
Status: DISCONTINUED | OUTPATIENT
Start: 2023-09-05 | End: 2023-09-06

## 2023-09-05 RX ORDER — NALOXONE HYDROCHLORIDE 0.4 MG/ML
0.2 INJECTION, SOLUTION INTRAMUSCULAR; INTRAVENOUS; SUBCUTANEOUS
Status: DISCONTINUED | OUTPATIENT
Start: 2023-09-05 | End: 2023-09-08 | Stop reason: HOSPADM

## 2023-09-05 RX ORDER — FENTANYL CITRATE 50 UG/ML
INJECTION, SOLUTION INTRAMUSCULAR; INTRAVENOUS PRN
Status: DISCONTINUED | OUTPATIENT
Start: 2023-09-05 | End: 2023-09-05

## 2023-09-05 RX ORDER — ONDANSETRON 2 MG/ML
INJECTION INTRAMUSCULAR; INTRAVENOUS PRN
Status: DISCONTINUED | OUTPATIENT
Start: 2023-09-05 | End: 2023-09-05

## 2023-09-05 RX ORDER — LIDOCAINE 40 MG/G
CREAM TOPICAL
Status: DISCONTINUED | OUTPATIENT
Start: 2023-09-05 | End: 2023-09-05 | Stop reason: HOSPADM

## 2023-09-05 RX ORDER — HYDRALAZINE HYDROCHLORIDE 20 MG/ML
10 INJECTION INTRAMUSCULAR; INTRAVENOUS EVERY 4 HOURS PRN
Status: DISCONTINUED | OUTPATIENT
Start: 2023-09-05 | End: 2023-09-08 | Stop reason: HOSPADM

## 2023-09-05 RX ORDER — GABAPENTIN 100 MG/1
100 CAPSULE ORAL AT BEDTIME
Status: DISCONTINUED | OUTPATIENT
Start: 2023-09-05 | End: 2023-09-08 | Stop reason: HOSPADM

## 2023-09-05 RX ORDER — HYDROMORPHONE HCL IN WATER/PF 6 MG/30 ML
0.2 PATIENT CONTROLLED ANALGESIA SYRINGE INTRAVENOUS
Status: DISCONTINUED | OUTPATIENT
Start: 2023-09-05 | End: 2023-09-08 | Stop reason: HOSPADM

## 2023-09-05 RX ORDER — NICOTINE POLACRILEX 4 MG
15-30 LOZENGE BUCCAL
Status: DISCONTINUED | OUTPATIENT
Start: 2023-09-05 | End: 2023-09-08 | Stop reason: HOSPADM

## 2023-09-05 RX ORDER — DEXTROSE MONOHYDRATE 25 G/50ML
25-50 INJECTION, SOLUTION INTRAVENOUS
Status: DISCONTINUED | OUTPATIENT
Start: 2023-09-05 | End: 2023-09-08 | Stop reason: HOSPADM

## 2023-09-05 RX ORDER — LANOLIN ALCOHOL/MO/W.PET/CERES
3 CREAM (GRAM) TOPICAL
Status: DISCONTINUED | OUTPATIENT
Start: 2023-09-05 | End: 2023-09-08 | Stop reason: HOSPADM

## 2023-09-05 RX ORDER — GLYCOPYRROLATE 0.2 MG/ML
INJECTION, SOLUTION INTRAMUSCULAR; INTRAVENOUS PRN
Status: DISCONTINUED | OUTPATIENT
Start: 2023-09-05 | End: 2023-09-05

## 2023-09-05 RX ORDER — EPHEDRINE SULFATE 50 MG/ML
INJECTION, SOLUTION INTRAMUSCULAR; INTRAVENOUS; SUBCUTANEOUS PRN
Status: DISCONTINUED | OUTPATIENT
Start: 2023-09-05 | End: 2023-09-05

## 2023-09-05 RX ORDER — SODIUM CHLORIDE, SODIUM LACTATE, POTASSIUM CHLORIDE, CALCIUM CHLORIDE 600; 310; 30; 20 MG/100ML; MG/100ML; MG/100ML; MG/100ML
INJECTION, SOLUTION INTRAVENOUS CONTINUOUS
Status: DISCONTINUED | OUTPATIENT
Start: 2023-09-05 | End: 2023-09-05 | Stop reason: HOSPADM

## 2023-09-05 RX ORDER — ONDANSETRON 4 MG/1
4 TABLET, ORALLY DISINTEGRATING ORAL EVERY 6 HOURS PRN
Status: DISCONTINUED | OUTPATIENT
Start: 2023-09-05 | End: 2023-09-08 | Stop reason: HOSPADM

## 2023-09-05 RX ORDER — CALCIUM CARBONATE 500 MG/1
500 TABLET, CHEWABLE ORAL 3 TIMES DAILY PRN
Status: DISCONTINUED | OUTPATIENT
Start: 2023-09-05 | End: 2023-09-08 | Stop reason: HOSPADM

## 2023-09-05 RX ORDER — DEXMEDETOMIDINE HYDROCHLORIDE 4 UG/ML
INJECTION, SOLUTION INTRAVENOUS CONTINUOUS PRN
Status: DISCONTINUED | OUTPATIENT
Start: 2023-09-05 | End: 2023-09-05

## 2023-09-05 RX ORDER — LIDOCAINE HYDROCHLORIDE 10 MG/ML
INJECTION, SOLUTION INFILTRATION; PERINEURAL PRN
Status: DISCONTINUED | OUTPATIENT
Start: 2023-09-05 | End: 2023-09-05

## 2023-09-05 RX ORDER — MAGNESIUM HYDROXIDE 1200 MG/15ML
LIQUID ORAL PRN
Status: DISCONTINUED | OUTPATIENT
Start: 2023-09-05 | End: 2023-09-05 | Stop reason: HOSPADM

## 2023-09-05 RX ORDER — AMLODIPINE BESYLATE 5 MG/1
10 TABLET ORAL DAILY
Status: DISCONTINUED | OUTPATIENT
Start: 2023-09-06 | End: 2023-09-07

## 2023-09-05 RX ORDER — CEFAZOLIN SODIUM/WATER 2 G/20 ML
2 SYRINGE (ML) INTRAVENOUS SEE ADMIN INSTRUCTIONS
Status: DISCONTINUED | OUTPATIENT
Start: 2023-09-05 | End: 2023-09-05 | Stop reason: HOSPADM

## 2023-09-05 RX ORDER — HYDROMORPHONE HYDROCHLORIDE 1 MG/ML
0.4 INJECTION, SOLUTION INTRAMUSCULAR; INTRAVENOUS; SUBCUTANEOUS EVERY 5 MIN PRN
Status: DISCONTINUED | OUTPATIENT
Start: 2023-09-05 | End: 2023-09-05 | Stop reason: HOSPADM

## 2023-09-05 RX ORDER — OXYCODONE HYDROCHLORIDE 5 MG/1
10 TABLET ORAL
Status: DISCONTINUED | OUTPATIENT
Start: 2023-09-05 | End: 2023-09-05 | Stop reason: HOSPADM

## 2023-09-05 RX ORDER — ONDANSETRON 8 MG/1
8 TABLET, FILM COATED ORAL 3 TIMES DAILY PRN
COMMUNITY

## 2023-09-05 RX ORDER — CEFAZOLIN SODIUM/WATER 2 G/20 ML
2 SYRINGE (ML) INTRAVENOUS
Status: COMPLETED | OUTPATIENT
Start: 2023-09-05 | End: 2023-09-05

## 2023-09-05 RX ORDER — ATENOLOL 25 MG/1
50 TABLET ORAL DAILY
Status: DISCONTINUED | OUTPATIENT
Start: 2023-09-06 | End: 2023-09-08 | Stop reason: HOSPADM

## 2023-09-05 RX ORDER — HYDROMORPHONE HCL IN WATER/PF 6 MG/30 ML
0.4 PATIENT CONTROLLED ANALGESIA SYRINGE INTRAVENOUS
Status: DISCONTINUED | OUTPATIENT
Start: 2023-09-05 | End: 2023-09-08 | Stop reason: HOSPADM

## 2023-09-05 RX ORDER — BISACODYL 10 MG
10 SUPPOSITORY, RECTAL RECTAL DAILY PRN
Status: DISCONTINUED | OUTPATIENT
Start: 2023-09-05 | End: 2023-09-08 | Stop reason: HOSPADM

## 2023-09-05 RX ORDER — AMOXICILLIN 250 MG
1 CAPSULE ORAL 2 TIMES DAILY
Status: DISCONTINUED | OUTPATIENT
Start: 2023-09-05 | End: 2023-09-08 | Stop reason: HOSPADM

## 2023-09-05 RX ORDER — HYDROMORPHONE HYDROCHLORIDE 1 MG/ML
0.2 INJECTION, SOLUTION INTRAMUSCULAR; INTRAVENOUS; SUBCUTANEOUS EVERY 5 MIN PRN
Status: DISCONTINUED | OUTPATIENT
Start: 2023-09-05 | End: 2023-09-05 | Stop reason: HOSPADM

## 2023-09-05 RX ORDER — KETAMINE HYDROCHLORIDE 10 MG/ML
INJECTION INTRAMUSCULAR; INTRAVENOUS PRN
Status: DISCONTINUED | OUTPATIENT
Start: 2023-09-05 | End: 2023-09-05

## 2023-09-05 RX ORDER — TRAMADOL HYDROCHLORIDE 50 MG/1
50 TABLET ORAL EVERY 6 HOURS PRN
Status: DISCONTINUED | OUTPATIENT
Start: 2023-09-05 | End: 2023-09-08 | Stop reason: HOSPADM

## 2023-09-05 RX ORDER — FENTANYL CITRATE 50 UG/ML
50 INJECTION, SOLUTION INTRAMUSCULAR; INTRAVENOUS EVERY 5 MIN PRN
Status: DISCONTINUED | OUTPATIENT
Start: 2023-09-05 | End: 2023-09-05 | Stop reason: HOSPADM

## 2023-09-05 RX ORDER — OXYCODONE HYDROCHLORIDE 5 MG/1
5 TABLET ORAL
Status: COMPLETED | OUTPATIENT
Start: 2023-09-05 | End: 2023-09-05

## 2023-09-05 RX ADMIN — OXYCODONE HYDROCHLORIDE 5 MG: 5 TABLET ORAL at 19:10

## 2023-09-05 RX ADMIN — PHENYLEPHRINE HYDROCHLORIDE 100 MCG: 10 INJECTION INTRAVENOUS at 17:43

## 2023-09-05 RX ADMIN — ROCURONIUM BROMIDE 10 MG: 50 INJECTION, SOLUTION INTRAVENOUS at 15:49

## 2023-09-05 RX ADMIN — SODIUM CHLORIDE, POTASSIUM CHLORIDE, SODIUM LACTATE AND CALCIUM CHLORIDE: 600; 310; 30; 20 INJECTION, SOLUTION INTRAVENOUS at 15:25

## 2023-09-05 RX ADMIN — GLYCOPYRROLATE 0.2 MG: 0.2 INJECTION INTRAMUSCULAR; INTRAVENOUS at 14:44

## 2023-09-05 RX ADMIN — ROCURONIUM BROMIDE 10 MG: 50 INJECTION, SOLUTION INTRAVENOUS at 16:31

## 2023-09-05 RX ADMIN — GABAPENTIN 100 MG: 100 CAPSULE ORAL at 21:46

## 2023-09-05 RX ADMIN — ROCURONIUM BROMIDE 50 MG: 50 INJECTION, SOLUTION INTRAVENOUS at 14:37

## 2023-09-05 RX ADMIN — FENTANYL CITRATE 100 MCG: 50 INJECTION, SOLUTION INTRAMUSCULAR; INTRAVENOUS at 14:37

## 2023-09-05 RX ADMIN — HYDROMORPHONE HYDROCHLORIDE 0.5 MG: 1 INJECTION, SOLUTION INTRAMUSCULAR; INTRAVENOUS; SUBCUTANEOUS at 15:30

## 2023-09-05 RX ADMIN — ONDANSETRON 4 MG: 2 INJECTION INTRAMUSCULAR; INTRAVENOUS at 14:44

## 2023-09-05 RX ADMIN — SODIUM CHLORIDE 500 ML: 9 INJECTION, SOLUTION INTRAVENOUS at 22:41

## 2023-09-05 RX ADMIN — KETAMINE HYDROCHLORIDE 30 MG: 10 INJECTION INTRAMUSCULAR; INTRAVENOUS at 14:37

## 2023-09-05 RX ADMIN — Medication 10 MG: at 17:39

## 2023-09-05 RX ADMIN — DEXAMETHASONE SODIUM PHOSPHATE 4 MG: 10 INJECTION, SOLUTION INTRAMUSCULAR; INTRAVENOUS at 14:44

## 2023-09-05 RX ADMIN — Medication 5 MG: at 16:42

## 2023-09-05 RX ADMIN — SODIUM CHLORIDE, POTASSIUM CHLORIDE, SODIUM LACTATE AND CALCIUM CHLORIDE: 600; 310; 30; 20 INJECTION, SOLUTION INTRAVENOUS at 14:05

## 2023-09-05 RX ADMIN — Medication 5 MG: at 16:31

## 2023-09-05 RX ADMIN — PHENYLEPHRINE HYDROCHLORIDE 0.5 MCG/KG/MIN: 10 INJECTION INTRAVENOUS at 14:57

## 2023-09-05 RX ADMIN — Medication 2 G: at 14:50

## 2023-09-05 RX ADMIN — ROCURONIUM BROMIDE 20 MG: 50 INJECTION, SOLUTION INTRAVENOUS at 15:09

## 2023-09-05 RX ADMIN — MIDAZOLAM 1 MG: 1 INJECTION INTRAMUSCULAR; INTRAVENOUS at 14:30

## 2023-09-05 RX ADMIN — SUGAMMADEX 150 MG: 100 INJECTION, SOLUTION INTRAVENOUS at 17:36

## 2023-09-05 RX ADMIN — HYDROMORPHONE HYDROCHLORIDE 0.2 MG: 0.2 INJECTION, SOLUTION INTRAMUSCULAR; INTRAVENOUS; SUBCUTANEOUS at 22:43

## 2023-09-05 RX ADMIN — ACETAMINOPHEN 975 MG: 325 TABLET ORAL at 21:46

## 2023-09-05 RX ADMIN — DEXMEDETOMIDINE HYDROCHLORIDE 0.5 MCG/KG/HR: 400 INJECTION INTRAVENOUS at 14:50

## 2023-09-05 RX ADMIN — Medication 5 MG: at 17:22

## 2023-09-05 RX ADMIN — PHENYLEPHRINE HYDROCHLORIDE 100 MCG: 10 INJECTION INTRAVENOUS at 15:40

## 2023-09-05 RX ADMIN — PROPOFOL 90 MG: 10 INJECTION, EMULSION INTRAVENOUS at 14:37

## 2023-09-05 RX ADMIN — LIDOCAINE HYDROCHLORIDE 50 MG: 10 INJECTION, SOLUTION INFILTRATION; PERINEURAL at 14:37

## 2023-09-05 RX ADMIN — SENNOSIDES AND DOCUSATE SODIUM 1 TABLET: 50; 8.6 TABLET ORAL at 21:46

## 2023-09-05 ASSESSMENT — ACTIVITIES OF DAILY LIVING (ADL)
ADLS_ACUITY_SCORE: 35

## 2023-09-05 NOTE — OP NOTE
Name:  Alok Apodacakaro  PCP:  Aleena Mahmood  Procedure Date:  9/5/2023      Procedure(s):  COLECTOMY, PARTIAL LAPAROSCOPIC, LAPAROSCOPIC LYSIS OF ADHESIONS  open sigmoidectomy,  ostomy creation    Pre-Procedure Diagnosis:  Mass of colon [K63.89]     Post-Procedure Diagnosis:    Colon mass    Surgeon(s):  Vijay Todd MD Borut, Thor Cordova DO    Circulator: Meseret Orantes RN; Santiago Gaxiola RN  Relief Circulator: Marquita King RN  Scrub Person: Magali Ross; Winter Freitas; Consuelo Herrera    Anesthesia Type: General      Findings:  Sigmoid mass with adherence to the left pelvic sidewall    Operative Report:    Patient was taken to the operating room and placed in a supine position.  Endotracheal intubation was performed.  The patient was then placed in a modified lithotomy position and a Stauffer catheter was placed.  Antibiotics were given prior to skin incision and the abdomen was prepped and draped in sterile fashion.  Dr. Todd was present throughout the case and instrumental in removing the mass, exposure and closure.    I first began at procedure by injecting local anesthetic above the umbilicus.  I made a 5 mm transverse incision and established a pneumoperitoneum with a Veress needle technique.  I then placed a 5 mm trocar into the abdomen with a 5 mm 30 degree camera.  I scrutinized the surrounding structures for any injuries upon entry I did not find any.  The abdomen appeared relatively normal with no evidence of any peritoneal studding.  The left liver lobe appeared grossly normal.  It was difficult to visualize the right liver lobe secondary to adhesions.  I placed 2 additional 5 mm trocars in the right mid abdominal wall and proceeded to use LigaSure sealing device to mobilize the descending colon by taking down the white line of Toldt.  This was taken up to the mid descending colon.  I then came down to the left pelvis and encountered the mass which was significantly adhered  to the left pelvic sidewall.  The colon was extremely dilated and was difficult to navigate around laparoscopically because of the loss of abdominal domain within the insufflated cavity.  I continued to try and mobilize the colon as best as possible without injuring the ureter on the left side.  However, because of the lack of space and the large nature of the mass and its adherence I elected to convert to an open procedure.      I then remove the trocars and made a lower midline incision and gained entry into the abdomen with sharp dissection electrocautery.  There was some scant fluid in the pelvis which was removed and sent off the field as specimen for cytology.  I then placed a Bookwalter retractor into the abdomen and mobilized the sigmoid colon once the remaining portion of the intestines were secured out of the operative view.  I mobilized the descending and sigmoid colon medially by taking down the white line of Toldt.  Eventually I came to the area of the adhesions and the desmoplastic reaction to the left pelvic sidewall.  This was carefully dissected off.  Care was taken so as to preserve the left ureter.  Eventually I was able to liberate this portion of the colon.  I then fired a 75 ALEC stapler across the distal descending colon.  Once this was done we then came across the colonic mesentery along the fascial embryologic planes using a LigaSure sealing device.  Large venous tributary was oversewn with 0 Vicryl suture in a stick tie fashion x2.  Eventually I had majority of the specimen liberated.  I then came across the rectosigmoid junction at the peritoneal reflection with a green load contour stapler.  Once this was complete the remaining specimen was liberated and then sent off the field after the proximal margin was tagged with a suture.  Hemostasis was achieved with electrocautery.  We then tagged the rectal stump with 0 Prolene suture.  We continue to mobilize the descending colon proximally but  then elected to hold off on reanastomosed seeing the colon.  The bulky lymph nodes noted in the specimen were concerning and there is feeling at the patient was going require chemotherapy sooner rather than later.  I then elected to cease any further dissection and I made an circular incision in the left abdominal wall.  We  the rectus muscle and brought to the descending colon through the site for the future colostomy.  This was secured in place with a Hornick.  I then irrigated the abdomen with several liters of warm normal saline.  Lap counts were correct and we closed the midline fascia with a running oh looped PDS suture.  The skin was then reapproximated with 0 Vicryl suture and a 4-0 Monocryl suture over a blue vessel loop.  The the laparoscopic port sites were reapproximated with 4-0 Monocryl sutures.  All wounds were then cleaned and covered with Steri-Strips and dry sterile dressing.  I then turned my attention to the colostomy and matured this in the standard fashion with 3-0 Vicryl pop-off sutures in interrupted fashion.  Once this was complete I then placed an ostomy appliance over the colostomy.  I then had the nursing team do a double counts of laps to ensure there was none left in the abdomen.  After 2 separate counts the counts were noted to be correct.  The patient was then subsequently extubated and sent to the PACU to undergo recovery.    Estimated Blood Loss:   50cc    Specimens:    ID Type Source Tests Collected by Time Destination   1 : SIGMOID COLON; SUTURE MARKED PROXIMAL MARGIN Tissue Large Intestine, Colon, Sigmoid SURGICAL PATHOLOGY EXAM Thor Fall,  9/5/2023  4:34 PM    A : PERITONEAL FLUID WITH SODIUM CHLORIDE FOR CYTOLOGY Washings Peritoneum NON-GYNECOLOGIC CYTOLOGY Thor Fall,  9/5/2023  3:41 PM           Drains:   Open Drain Medial;Superior Abdomen  (Active)       Urethral Catheter 09/05/23 Latex;Double-lumen 12 fr (Active)       Complications:     Sanchez Fall, DO

## 2023-09-05 NOTE — ANESTHESIA PROCEDURE NOTES
Airway       Patient location during procedure: OR       Procedure Start/Stop Times: 9/5/2023 2:43 PM  Staff -        Anesthesiologist:  Shreyas Martin MD       Performed By: anesthesiologistIndications and Patient Condition       Indications for airway management: cristin-procedural       Induction type:intravenous       Mask difficulty assessment: 1 - vent by mask    Final Airway Details       Final airway type: endotracheal airway       Successful airway: ETT - single  Endotracheal Airway Details        ETT size (mm): 7.0       Cuffed: yes       Successful intubation technique: direct laryngoscopy       DL Blade Type: MAC 4       Grade View of Cords: 1       Adjucts: stylet       Position: Right       Measured from: gums/teeth       Secured at (cm): 23    Post intubation assessment        Placement verified by: capnometry and equal breath sounds        Number of attempts at approach: 2       Number of other approaches attempted: 1       Secured with: cloth tape       Ease of procedure: easy       Dental guard used and removed.    Medication(s) Administered   Medication Administration Time: 9/5/2023 2:43 PM    Additional Comments       1st attempt w/ micah by crna,g1m between attempts,  2nd attempt mac 4 G1v

## 2023-09-05 NOTE — ANESTHESIA CARE TRANSFER NOTE
Patient: Alok Mcfarland    Procedure: Procedure(s):  COLECTOMY, PARTIAL LAPAROSCOPIC, LAPAROSCOPIC LYSIS OF ADHESIONS  open sigmoidectomy,  ostomy creation       Diagnosis: Mass of colon [K63.89]  Diagnosis Additional Information: No value filed.    Anesthesia Type:   General     Note:    Oropharynx: oropharynx clear of all foreign objects  Level of Consciousness: drowsy  Oxygen Supplementation: face mask  Level of Supplemental Oxygen (L/min / FiO2): 8  Independent Airway: airway patency satisfactory and stable  Dentition: dentition unchanged  Vital Signs Stable: post-procedure vital signs reviewed and stable  Report to RN Given: handoff report given  Patient transferred to: PACU  Comments: Patient spontaneously breathing.  Tidal volumes > 400ml.  Following commands, suctioned and extubated with [place].  O2 via mask at 8L.  To PACU, VSS, SBAR report to RN per institutional handoff policy and procedure.  Transfer of care.   Handoff Report: Identifed the Patient, Identified the Reponsible Provider, Reviewed the pertinent medical history, Discussed the surgical course, Reviewed Intra-OP anesthesia mangement and issues during anesthesia, Set expectations for post-procedure period and Allowed opportunity for questions and acknowledgement of understanding      Vitals:  Vitals Value Taken Time   /59 09/05/23 1748   Temp     Pulse 78 09/05/23 1750   Resp 12 09/05/23 1750   SpO2 100 % 09/05/23 1750   Vitals shown include unvalidated device data.    Electronically Signed By: VANDANA Alcaraz CRNA  September 5, 2023  5:52 PM

## 2023-09-05 NOTE — ANESTHESIA PREPROCEDURE EVALUATION
Anesthesia Pre-Procedure Evaluation    Patient: Alok Mcfarland   MRN: [id number] : 1950        Procedure : Procedure(s):  COLECTOMY, PARTIAL LAPAROSCOPIC  possible open sigmoidectomy,  possible ostomy          Past Medical History:   Diagnosis Date    Diabetes (H)     Hypertension       Past Surgical History:   Procedure Laterality Date    IR MISCELLANEOUS PROCEDURE  10/18/2000      No Known Allergies   Social History     Tobacco Use    Smoking status: Never    Smokeless tobacco: Never   Substance Use Topics    Alcohol use: Never      Wt Readings from Last 1 Encounters:   23 77.1 kg (170 lb)        Anesthesia Evaluation   Pt has had prior anesthetic. Type: General.    No history of anesthetic complications       ROS/MED HX  ENT/Pulmonary:  - neg pulmonary ROS     Neurologic:  - neg neurologic ROS     Cardiovascular:  - neg cardiovascular ROS   (+)  hypertension- -   -  - -                                      METS/Exercise Tolerance: >4 METS    Hematologic:  - neg hematologic  ROS     Musculoskeletal:  - neg musculoskeletal ROS     GI/Hepatic:  - neg GI/hepatic ROS     Renal/Genitourinary:  - neg Renal ROS     Endo:  - neg endo ROS   (+) type I DM,                     Psychiatric/Substance Use:  - neg psychiatric ROS     Infectious Disease:  - neg infectious disease ROS     Malignancy:  - neg malignancy ROS (+) 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 ),     Other:  - neg other ROS          Physical Exam    Airway        Mallampati: I   TM distance: > 3 FB   Neck ROM: full   Mouth opening: > 3 cm    Respiratory Devices and Support         Dental       (+) Minor Abnormalities - some fillings, tiny chips      Cardiovascular   cardiovascular exam normal          Pulmonary   pulmonary exam normal                OUTSIDE LABS:  CBC:   Lab Results   Component Value Date    WBC 7.2 2023    HGB 11.8 (L) 2023    HCT 39.7 (L) 2023     2023     BMP:   Lab Results    Component Value Date     (L) 08/24/2023     03/20/2023    POTASSIUM 3.7 08/24/2023    POTASSIUM 4.0 03/20/2023    CHLORIDE 98 08/24/2023    CHLORIDE 102 03/20/2023    CO2 24 08/24/2023    CO2 26 03/20/2023    BUN 34.4 (H) 08/24/2023    BUN 16.4 03/20/2023    CR 1.32 (H) 08/24/2023    CR 1.36 (H) 03/20/2023     (H) 08/24/2023     (H) 03/20/2023     COAGS: No results found for: PTT, INR, FIBR  POC: No results found for: BGM, HCG, HCGS  HEPATIC:   Lab Results   Component Value Date    ALBUMIN 3.4 (L) 08/24/2023    PROTTOTAL 5.7 (L) 08/24/2023    ALT 6 08/24/2023    AST 13 08/24/2023    GGT 9 08/24/2023    ALKPHOS 64 08/24/2023    BILITOTAL 0.6 08/24/2023     OTHER:   Lab Results   Component Value Date    BHAVESH 8.0 (L) 08/24/2023    PHOS 3.7 04/05/2023    MAG 1.4 (L) 04/05/2023    LIPASE 20 08/24/2023    TSH 3.94 04/05/2023       Anesthesia Plan    ASA Status:  3       Anesthesia Type: General.     - Airway: ETT              Consents    Anesthesia Plan(s) and associated risks, benefits, and realistic alternatives discussed. Questions answered and patient/representative(s) expressed understanding.     - Discussed: Risks, Benefits and Alternatives for BOTH SEDATION and the PROCEDURE were discussed     - Discussed with:  Patient, Spouse            Postoperative Care    Pain management: IV analgesics, Multi-modal analgesia.   PONV prophylaxis: Ondansetron (or other 5HT-3), Dexamethasone or Solumedrol     Comments:    Other Comments: Ketamine, dexmed.    Patient consented for TAP blocks/ rectus sheath block prn if procedure does end up being open. Risks and benefits discussed. Patient ok w/ block prn.             Shreyas Martin MD

## 2023-09-05 NOTE — INTERVAL H&P NOTE
I have reviewed the surgical (or preoperative) H&P that is linked to this encounter, and examined the patient. There are no significant changes    Lungs- clear to auscultation bilaterally  CV- regular rate and rhythm.     Plan for Procedure(s):  COLECTOMY, PARTIAL LAPAROSCOPIC  possible open sigmoidectomy,  possible ostomy     Juan F Fall [facility]  [phone]

## 2023-09-05 NOTE — PHARMACY-ADMISSION MEDICATION HISTORY
Pharmacist Admission Medication History    Admission medication history is complete. The information provided in this note is only as accurate as the sources available at the time of the update.    Medication reconciliation/reorder completed by provider prior to medication history? No    Information Source(s): Patient, Clinic records, and CareEverywhere/SureScripts via in-person    Pertinent Information: none    Medication Affordability:  Not including over the counter (OTC) medications, was there a time in the past 3 months when you did not take your medications as prescribed because of cost?: No    Allergies reviewed with patient and updates made in EHR: yes    Medication History Completed By: Vijay Reveles [facility] 9/5/2023 1:22 PM    Prior to Admission medications    Medication Sig Last Dose Taking? Auth Provider Long Term End Date   amLODIPine (NORVASC) 10 MG tablet Take 10 mg by mouth daily 9/5/2023 Yes Reported, Patient Yes    atenolol (TENORMIN) 50 MG tablet Take 50 mg by mouth daily 9/5/2023 Yes Reported, Patient Yes    lisinopril (ZESTRIL) 30 MG tablet Take 30 mg by mouth daily 9/4/2023 Yes Reported, Patient Yes    metFORMIN (GLUCOPHAGE) 500 MG tablet Take 1,000 mg by mouth daily (with breakfast) 9/4/2023 Yes Reported, Patient Yes    ondansetron (ZOFRAN) 8 MG tablet Take 8 mg by mouth 3 times daily as needed for nausea Past Week Yes Unknown, Entered By History     pravastatin (PRAVACHOL) 20 MG tablet TAKE ONE TABLET BY MOUTH EVERY NIGHT AT BEDTIME* 9/4/2023 Yes Reported, Patient Yes

## 2023-09-05 NOTE — ANESTHESIA PROCEDURE NOTES
Airway       Patient location during procedure: OR       Procedure Start/Stop Times: 9/5/2023 2:43 PM  Staff -        CRNA: Calixto Murray APRN CRNA       Performed By: CRNA  Consent for Airway        Urgency: elective  Indications and Patient Condition       Indications for airway management: cristin-procedural         Mask difficulty assessment: 1 - vent by mask    Final Airway Details       Final airway type: endotracheal airway       Successful airway: ETT - single  Endotracheal Airway Details        ETT size (mm): 7.0       Cuffed: yes       Successful intubation technique: direct laryngoscopy       DL Blade Type: MAC 3       Grade View of Cords: 1       Adjucts: stylet       Position: Right       Measured from: gums/teeth       Secured at (cm): 21    Post intubation assessment        Placement verified by: capnometry, equal breath sounds and chest rise        Number of attempts at approach: 1       Number of other approaches attempted: 0       Secured with: silk tape       Ease of procedure: easy       Dentition: Unchanged and Intact       Dental guard used and removed. Dental Guard Type: Proguard Red.    Medication(s) Administered   Medication Administration Time: 9/5/2023 2:43 PM

## 2023-09-06 LAB
ANION GAP SERPL CALCULATED.3IONS-SCNC: 14 MMOL/L (ref 7–15)
BUN SERPL-MCNC: 23.3 MG/DL (ref 8–23)
CALCIUM SERPL-MCNC: 7.5 MG/DL (ref 8.8–10.2)
CHLORIDE SERPL-SCNC: 108 MMOL/L (ref 98–107)
CREAT SERPL-MCNC: 1.36 MG/DL (ref 0.67–1.17)
DEPRECATED HCO3 PLAS-SCNC: 16 MMOL/L (ref 22–29)
ERYTHROCYTE [DISTWIDTH] IN BLOOD BY AUTOMATED COUNT: 19.4 % (ref 10–15)
GFR SERPL CREATININE-BSD FRML MDRD: 56 ML/MIN/1.73M2
GLUCOSE BLDC GLUCOMTR-MCNC: 108 MG/DL (ref 70–99)
GLUCOSE BLDC GLUCOMTR-MCNC: 118 MG/DL (ref 70–99)
GLUCOSE BLDC GLUCOMTR-MCNC: 148 MG/DL (ref 70–99)
GLUCOSE BLDC GLUCOMTR-MCNC: 90 MG/DL (ref 70–99)
GLUCOSE SERPL-MCNC: 104 MG/DL (ref 70–99)
HCT VFR BLD AUTO: 30 % (ref 40–53)
HGB BLD-MCNC: 9.2 G/DL (ref 13.3–17.7)
MAGNESIUM SERPL-MCNC: 1.2 MG/DL (ref 1.7–2.3)
MAGNESIUM SERPL-MCNC: 2.3 MG/DL (ref 1.7–2.3)
MCH RBC QN AUTO: 20.3 PG (ref 26.5–33)
MCHC RBC AUTO-ENTMCNC: 30.7 G/DL (ref 31.5–36.5)
MCV RBC AUTO: 66 FL (ref 78–100)
PHOSPHATE SERPL-MCNC: 4.1 MG/DL (ref 2.5–4.5)
PLATELET # BLD AUTO: 179 10E3/UL (ref 150–450)
POTASSIUM SERPL-SCNC: 3.6 MMOL/L (ref 3.4–5.3)
POTASSIUM SERPL-SCNC: 4.3 MMOL/L (ref 3.4–5.3)
RBC # BLD AUTO: 4.54 10E6/UL (ref 4.4–5.9)
SODIUM SERPL-SCNC: 138 MMOL/L (ref 136–145)
WBC # BLD AUTO: 10.5 10E3/UL (ref 4–11)

## 2023-09-06 PROCEDURE — 83735 ASSAY OF MAGNESIUM: CPT | Performed by: INTERNAL MEDICINE

## 2023-09-06 PROCEDURE — 120N000001 HC R&B MED SURG/OB

## 2023-09-06 PROCEDURE — 250N000011 HC RX IP 250 OP 636: Mod: JZ | Performed by: SURGERY

## 2023-09-06 PROCEDURE — 85027 COMPLETE CBC AUTOMATED: CPT | Performed by: INTERNAL MEDICINE

## 2023-09-06 PROCEDURE — 36415 COLL VENOUS BLD VENIPUNCTURE: CPT | Performed by: SURGERY

## 2023-09-06 PROCEDURE — 99233 SBSQ HOSP IP/OBS HIGH 50: CPT | Performed by: INTERNAL MEDICINE

## 2023-09-06 PROCEDURE — 84132 ASSAY OF SERUM POTASSIUM: CPT | Performed by: SURGERY

## 2023-09-06 PROCEDURE — 84100 ASSAY OF PHOSPHORUS: CPT | Performed by: INTERNAL MEDICINE

## 2023-09-06 PROCEDURE — 36415 COLL VENOUS BLD VENIPUNCTURE: CPT | Performed by: INTERNAL MEDICINE

## 2023-09-06 PROCEDURE — 250N000013 HC RX MED GY IP 250 OP 250 PS 637: Performed by: SURGERY

## 2023-09-06 PROCEDURE — 83735 ASSAY OF MAGNESIUM: CPT | Performed by: SURGERY

## 2023-09-06 PROCEDURE — 80048 BASIC METABOLIC PNL TOTAL CA: CPT | Performed by: INTERNAL MEDICINE

## 2023-09-06 PROCEDURE — 258N000003 HC RX IP 258 OP 636: Performed by: INTERNAL MEDICINE

## 2023-09-06 RX ORDER — SODIUM CHLORIDE 9 MG/ML
INJECTION, SOLUTION INTRAVENOUS CONTINUOUS
Status: DISCONTINUED | OUTPATIENT
Start: 2023-09-06 | End: 2023-09-07

## 2023-09-06 RX ORDER — MAGNESIUM SULFATE 4 G/50ML
4 INJECTION INTRAVENOUS ONCE
Status: COMPLETED | OUTPATIENT
Start: 2023-09-06 | End: 2023-09-06

## 2023-09-06 RX ORDER — POTASSIUM CHLORIDE 1500 MG/1
20 TABLET, EXTENDED RELEASE ORAL ONCE
Status: COMPLETED | OUTPATIENT
Start: 2023-09-06 | End: 2023-09-06

## 2023-09-06 RX ADMIN — SODIUM CHLORIDE: 9 INJECTION, SOLUTION INTRAVENOUS at 00:52

## 2023-09-06 RX ADMIN — POTASSIUM CHLORIDE 20 MEQ: 1500 TABLET, EXTENDED RELEASE ORAL at 14:15

## 2023-09-06 RX ADMIN — GABAPENTIN 100 MG: 100 CAPSULE ORAL at 21:42

## 2023-09-06 RX ADMIN — SODIUM CHLORIDE: 9 INJECTION, SOLUTION INTRAVENOUS at 14:10

## 2023-09-06 RX ADMIN — ENOXAPARIN SODIUM 40 MG: 40 INJECTION SUBCUTANEOUS at 14:16

## 2023-09-06 RX ADMIN — HYDROMORPHONE HYDROCHLORIDE 0.2 MG: 0.2 INJECTION, SOLUTION INTRAMUSCULAR; INTRAVENOUS; SUBCUTANEOUS at 06:40

## 2023-09-06 RX ADMIN — ACETAMINOPHEN 975 MG: 325 TABLET ORAL at 21:42

## 2023-09-06 RX ADMIN — ACETAMINOPHEN 975 MG: 325 TABLET ORAL at 14:15

## 2023-09-06 RX ADMIN — SENNOSIDES AND DOCUSATE SODIUM 1 TABLET: 50; 8.6 TABLET ORAL at 21:42

## 2023-09-06 RX ADMIN — SENNOSIDES AND DOCUSATE SODIUM 1 TABLET: 50; 8.6 TABLET ORAL at 09:14

## 2023-09-06 RX ADMIN — POLYETHYLENE GLYCOL 3350 17 G: 17 POWDER, FOR SOLUTION ORAL at 09:14

## 2023-09-06 RX ADMIN — MAGNESIUM SULFATE HEPTAHYDRATE 4 G: 80 INJECTION, SOLUTION INTRAVENOUS at 14:12

## 2023-09-06 RX ADMIN — TRAMADOL HYDROCHLORIDE 50 MG: 50 TABLET, COATED ORAL at 21:42

## 2023-09-06 RX ADMIN — ACETAMINOPHEN 975 MG: 325 TABLET ORAL at 06:40

## 2023-09-06 ASSESSMENT — ACTIVITIES OF DAILY LIVING (ADL)
ADLS_ACUITY_SCORE: 18
ADLS_ACUITY_SCORE: 24
ADLS_ACUITY_SCORE: 18
ADLS_ACUITY_SCORE: 24
ADLS_ACUITY_SCORE: 24
ADLS_ACUITY_SCORE: 18
ADLS_ACUITY_SCORE: 18
ADLS_ACUITY_SCORE: 24
ADLS_ACUITY_SCORE: 18
ADLS_ACUITY_SCORE: 24
ADLS_ACUITY_SCORE: 18
ADLS_ACUITY_SCORE: 18

## 2023-09-06 NOTE — PROGRESS NOTES
Alok Mcfarland is doing well, tolerating diet, pain controlled        Vitals:    09/05/23 2243 09/05/23 2338 09/06/23 0640 09/06/23 0742   BP: 92/56 96/62 100/57 95/53   BP Location: Left arm Left arm Left arm Left arm   Patient Position:       Pulse: 68 58  60   Resp:  17  18   Temp:  98.7  F (37.1  C)  97.4  F (36.3  C)   TempSrc:  Oral  Axillary   SpO2: 96% 96%  98%   Height:           PHYSICAL EXAM:  GEN: No acute distress, comfortable  ABD:distended, stoma pink and patent, dressings intactx  EXT:No cyanosis, edema or obvious abnormalities        Recent Labs   Lab 09/06/23  0608   WBC 10.5   HGB 9.2*   HCT 30.0*          Recent Labs   Lab 09/06/23  0608      CO2 16*   BUN 23.3*         A/P:  Alok Linallison is s/p sigmoidectomy with colostomy  -diet already advanced which is ok given ostomy output  -has not ambulated yet, will write order for RN to ambulate  -awaiting pathology    Thor Fall, DO  FACS  [phone]  [facility] Department of Surgery

## 2023-09-06 NOTE — PLAN OF CARE
Problem: Plan of Care - These are the overarching goals to be used throughout the patient stay.    Goal: Plan of Care Review  Description: The Plan of Care Review/Shift note should be completed every shift.  The Outcome Evaluation is a brief statement about your assessment that the patient is improving, declining, or no change.  This information will be displayed automatically on your shift note.  Outcome: Progressing   Goal Outcome Evaluation:  Pt alert and oriented, abernathy in place, dilaudid x1, and schedule tylenol given, POD 1 Blood sugar 90, slept between cares

## 2023-09-06 NOTE — PROGRESS NOTES
"ASSESSMENT:  No diagnosis found.    Alok Mcfarland is a 72 year old male who is s/p open sigmoidectomy with colostomy creation. He is already showing good signs of bowel function this morning with flatus and feculent matter in the ostomy bag. He still feels weak/unsteady and this preceded surgery. Will have PT/OT evaluate him for safety before discharging home.    PLAN:  Await pathology  Diabetic diet  WO consult for new colostomy  PT/OT for safety eval  Hopefully home tomorrow if doing well    SUBJECTIVE:   He is feeling well. He has not been out of bed since surgery. He is worrying about getting up and walking as he feels so weak and is unsteady at times. He has been feeling this way for the past week before coming into the hospital. He has not felt it since surgery but attributes this to his inactivity. He denies nausea and vomiting; is tolerating clear liquids. He states that the nurses had to empty his ostomy bag this morning \"before it exploded\".       Patient Vitals for the past 24 hrs:   BP Temp Temp src Pulse Resp SpO2 Height   09/06/23 0742 95/53 97.4  F (36.3  C) Axillary 60 18 98 % --   09/06/23 0640 100/57 -- -- -- -- -- --   09/05/23 2338 96/62 98.7  F (37.1  C) Oral 58 17 96 % --   09/05/23 2243 92/56 -- -- 68 -- 96 % --   09/05/23 2152 91/55 97.5  F (36.4  C) Oral 69 17 96 % --   09/05/23 2148 (!) 88/54 -- -- 66 -- 95 % --   09/05/23 2030 92/58 -- -- 69 -- 95 % --   09/05/23 1936 97/57 98.3  F (36.8  C) Oral 72 -- 95 % --   09/05/23 1910 -- 97  F (36.1  C) -- -- -- -- --   09/05/23 1900 105/62 -- -- 74 13 94 % --   09/05/23 1845 108/64 -- -- 75 13 94 % --   09/05/23 1830 100/58 -- -- 74 12 93 % --   09/05/23 1815 104/61 -- -- 77 12 93 % --   09/05/23 1800 106/60 -- -- 79 12 95 % --   09/05/23 1750 -- 98.2  F (36.8  C) Temporal -- -- -- --   09/05/23 1745 105/59 -- -- 83 -- 100 % --   09/05/23 1300 99/68 98.1  F (36.7  C) Oral 79 20 95 % 1.753 m (5' 9\")         PHYSICAL EXAM:  GEN: No acute " distress, comfortable  LUNGS: CTA bilaterally  CV:RRR  ABD:soft, expected ttp POD1, not distended, stoma perfused and nonedematous, gas and feculent material noted in the ostomy device  EXT:No cyanosis, edema or obvious abnormalities    09/05 0700 - 09/06 0659  In: 3490 [P.O.:240; I.V.:2750]  Out: 460 [Urine:410]    Admission on 09/05/2023   Component Date Value    GLUCOSE BY METER POCT 09/05/2023 109 (H)     CEA 09/05/2023 1.1     GLUCOSE BY METER POCT 09/05/2023 113 (H)     GLUCOSE BY METER POCT 09/05/2023 143 (H)     Hemoglobin 09/05/2023 9.5 (L)     Sodium 09/06/2023 138     Potassium 09/06/2023 3.6     Chloride 09/06/2023 108 (H)     Carbon Dioxide (CO2) 09/06/2023 16 (L)     Anion Gap 09/06/2023 14     Urea Nitrogen 09/06/2023 23.3 (H)     Creatinine 09/06/2023 1.36 (H)     Calcium 09/06/2023 7.5 (L)     Glucose 09/06/2023 104 (H)     GFR Estimate 09/06/2023 56 (L)     WBC Count 09/06/2023 10.5     RBC Count 09/06/2023 4.54     Hemoglobin 09/06/2023 9.2 (L)     Hematocrit 09/06/2023 30.0 (L)     MCV 09/06/2023 66 (L)     MCH 09/06/2023 20.3 (L)     MCHC 09/06/2023 30.7 (L)     RDW 09/06/2023 19.4 (H)     Platelet Count 09/06/2023 179     GLUCOSE BY METER POCT 09/06/2023 90     Magnesium 09/06/2023 1.2 (L)     Phosphorus 09/06/2023 4.1           Andra Mercado, APRN CNP

## 2023-09-06 NOTE — PLAN OF CARE
Goal Outcome Evaluation:       Pt arrived via stretcher from surgery at 1930. Accompanied by wife. Oriented to room and unit routine. Tolerating sips of clear liquids. Stauffer catheter patent, draining gabe urine.

## 2023-09-06 NOTE — CONSULTS
"[facility] Nurse Inpatient Assessment     Consulted for: colostomy    Summary: Dr Fall at bedside during pouch change, see his note    Patient History (according to provider note(s):      Andra Mercado APRN CNP   Nurse Practitioner  Surgery     Progress Notes      Signed     Date of Service: 9/6/2023 11:34 AM  Creation Time: 9/6/2023 11:34 AM       ASSESSMENT:  No diagnosis found.     Alok Mcfarland is a 72 year old male who is s/p open sigmoidectomy with colostomy creation. He is already showing good signs of bowel function this morning with flatus and feculent matter in the ostomy bag. He still feels weak/unsteady and this preceded surgery. Will have PT/OT evaluate him for safety before discharging home.     PLAN:  Await pathology  Diabetic diet  [facility] consult for new colostomy  PT/OT for safety eval  Hopefully home tomorrow if doing well     SUBJECTIVE:   He is feeling well. He has not been out of bed since surgery. He is worrying about getting up and walking as he feels so weak and is unsteady at times. He has been feeling this way for the past week before coming into the hospital. He has not felt it since surgery but attributes this to his inactivity. He denies nausea and vomiting; is tolerating clear liquids. He states that the nurses had to empty his ostomy bag this morning \"before it exploded\".           Assessment:      Assessment of new end Colostomy:  Diagnosis Pertinent to Stoma: Cancer - Colon or Rectum      Surgery Date: 9/5  Surgeon: Eufemia       Hospital: [facility]  Pouching system in place on assessment today: Cesar one piece and flat   Pouch barrier status: intact and Minimal melting  Pouch last changed/wear time: 1 day  Reason for pouch change today: ostomy education and initial post-op assessment  Effectiveness of current pouching/ supply plan: Attempting new system, will re-evaluate next assessment  Change made with ostomy management today: " Yes  Pouching system placed today: Cesar two piece, cut to fit, convex, barrier ring, and skin prep   Supplies: gathered      Last photo: 9/6  Stoma location: LLQ  Stoma size: 38mm ,   Stoma appearance: viable, red, oval, flat, and venous congestion to the perimeter, MD aware  Mucocutaneous junction:  intact, os at 11 oclock and a small dimple to this area  Peristomal complication(s): none   Output: liquid and brown  Output volume emptied during visit: 100ml  Abdominal assessment: Soft  Surgical site(s): dressing dry and intact  NG still in place? No  Pain: Dull ache  Is patient still on a PCA? No    Ostomy education assessment:  Participant of teaching session today: patient  and spouse  Education completed today: Initial fitting, Stoma assessment, Pouching system assessment , Refitting of appliance , Pouch change demonstration, Ostomy accessory product use , Introduction to pouches, Pouch emptying demonstration, Importance of hydration, When to seek medical attention, and Low fiber diet   Educational materials/methods: Verbal, Written, Demonstration,  hand out, MELISSA Razo education hand out, Product sample, Addressed patient/caregiver questions/concerns, and Left packet of information at bedside   Education still needed: Pouch change return demonstration, Peristomal skin care, Pouch emptying return demonstration, Intake and output recording, Fluid and electrolyte balance , Odor/flatus management , Infection prevention/hygiene , Hernia prevention, Lifestyle adjustments , and Discharge instructions  Learning Comprehension:   Psychosocial assessment: wife at bedside  Patient readiness for education today: lethargic and observing  Following today's visit: patient  and spouse is able to demonstrate;         1. How to empty their pouch? Demo provided         2. How to change their pouch? Demo provided         3. How to read and record intake and output correctly? Demo provided   Preparation for  "discharge completed: No discharge preparation started yet  Preparation for discharge still needed: Placed prescription recommendations in discharge navigator for MD to sign, Ensured patient has extra supplies for discharge, Discussed how to order supplies after discharge , Ordered samples from  after gaining consent from patient/caregiver, Discussed how and when to make an outpatient WOC nurse appointment after discharge, Prepared for discharge home with home care, and Discuss signs/symptoms of when to seek medical attention  Pt support system on discharge: wife  WOC recommend home care? By WOC RN if possible  Face to face time: 35 minutes    NP note says  possible discharge tomorrow? Patient and wife seem nervous about ability to change pouch but wife will do hands on tomorrow.     Treatment Plan:     LUQ Colostomy pouching plan:   Pouching system: ostomy supplies pouches: Cesar 57 FECAL ([id number]) ostomy supplies barrier: Cesar 57mm SOFT CONVEX ([id number])  Accessories used: [facility] ostomy accessories: 2\" Cera Barrier Ring ([id number]) and Cavilon no sting barrier film ([id number])   Frequency of pouch changes: Twice weekly and PRN leakage  WOC follow up plan: Daily Monday-Friday (as able)  Bedside RN interventions: Change pouch PRN if leaking using the supplies above, Empty pouch when 1/3 to 1/2 full, ensure to clean pouch outlet after emptying to prevent odor, Notify WOC for ongoing pouch leakage, Document stoma appearance and output volume, color, and consistency every shift, Encourage patient to empty pouch with assist, and Assist patient to measure and record output      Orders: Written    RECOMMEND PRIMARY TEAM ORDER: None, at this time  Education provided: plan of care  Discussed plan of care with: Patient and Family  WOC nurse follow-up plan: daily  Notify WOC if wound(s) deteriorate.  Nursing to notify the Provider(s) and re-consult the WOC Nurse if new skin concern.    DATA:     Current support " surface: Standard  Standard gel/foam mattress (IsoFlex, Atmos air, etc)  Containment of urine/stool: Incontinence Protocol  BMI: Body mass index is 25.1 kg/m .   Active diet order: Orders Placed This Encounter      Moderate Consistent Carb (60 g CHO per Meal) Diet     Output: I/O last 3 completed shifts:  In: 3730 [P.O.:480; I.V.:2750; IV Piggyback:500]  Out: 660 [Urine:610; Blood:50]     Labs:   Recent Labs   Lab 09/06/23  0608   HGB 9.2*   WBC 10.5     Pressure injury risk assessment:   Sensory Perception: 4-->no impairment  Moisture: 4-->rarely moist  Activity: 3-->walks occasionally  Mobility: 3-->slightly limited  Nutrition: 2-->probably inadequate  Friction and Shear: 3-->no apparent problem  Jeison Score: 19    IRAM RayN RN CWOCN  Pager no longer in use, please contact through [employer] group: [facility] [employer]

## 2023-09-06 NOTE — PROGRESS NOTES
"[facility]    Medicine Progress Note - Hospitalist Service    Date of Admission:  9/5/2023    Assessment & Plan   72-year-old female with history of CKD 3, hypertension, hyperlipidemia, DM2 and recent diagnosis of colon mass is admitted for colectomy.  [facility] consulted for medical management.      History of hypertension: BP currently soft  -- Hold home lisinopril  -- Continue home atenolol and amlodipine in the a.m. as BP allows  -- Monitor hemodynamics for IV fluid boluses as needed  -- bolus 500ml NS now, start MIVF with NS at 75cc/hr overnight  -- check Hgb to ensure stability       DM2:  -- Hold metformin for now  -- Continue sliding scale insulin and escalate insulin needs as needed      History of colon mass:  Status post colectomy and JESSICA 9/5/2023  -- Postop cares and diet advancement per surgery  -- Follow-up a.m. labs      CKD3: baseline creatinine ~1.3  -- trend in AM      Hyperlipidemia: Holding home statin for now       Diet: Combination Diet Clear Liquid; Moderate Consistent Carb (60 g CHO per Meal) Diet    DVT Prophylaxis: Enoxaparin (Lovenox) SQ  Stauffer Catheter: PRESENT, indication: /GI/GYN Pelvic Procedure  Lines: None     Cardiac Monitoring: None  Code Status: Full Code      Clinically Significant Risk Factors Present on Admission                  # Hypertension: Home medication list includes antihypertensive(s)      # Overweight: Estimated body mass index is 25.1 kg/m  as calculated from the following:    Height as of this encounter: 1.753 m (5' 9\").    Weight as of 8/31/23: 77.1 kg (170 lb).              Disposition Plan      Expected Discharge Date: 09/07/2023                  Ki Skinner,   Hospitalist Service  [facility]  Securely message with [employer] (more info)  Text page via [employer] Paging/Directory   ______________________________________________________________________    Interval History   NAD. Endorses lightheadedness     Physical " Exam   Vital Signs: Temp: 98.3  F (36.8  C) Temp src: Oral BP: 97/57 Pulse: 72   Resp: 13 SpO2: 95 % O2 Device: None (Room air) Oxygen Delivery: 6 LPM  Weight: 0 lbs 0 oz  General: NAD  RESPIRATORY: Breathing nonlabored  CARDIOVASCULAR: No le edema bilat.   ABDOMEN: deferred  NEUROLOGIC: Alert, speech clear         Medical Decision Making       >55 MINUTES SPENT BY ME on the date of service doing chart review, history, exam, documentation & further activities per the note.      Data

## 2023-09-06 NOTE — PROGRESS NOTES
[facility]    Medicine Progress Note - Hospitalist Service    Date of Admission:  9/5/2023    Assessment & Plan   72-year-old female with past medical history of CKD 3, hypertension, hyperlipidemia, DM2 and recent diagnosis of colon mass who underwent elective open sigmoidectomy with stoma creation and admitted.  Hospitalist medicine service consulted for medical management.    History of essential hypertension;  Postoperative hypotension;  --Patient did received bolus normal saline IV fluid, continue continuous IV fluid until adequate oral intake.  -- Continue to hold PTA lisinopril, amlodipine.  PTA atenolol with holding parameters.    --Monitor vitals and adjust medications accordingly.    Acute on chronic anemia, ABLA postoperative and hemodilution;  -- Preop hemoglobin 11.8.  Today hemoglobin 9.2.  --Monitor hemoglobin closely, transfuse per surgery protocol    Type II DM;  -- Hold PTA metformin until on regular food  --Insulin sliding scale hypoglycemic protocol    CKD stage III;  Metabolic acidosis;  Electrolyte imbalance;  -- Creatinine fairly stable.  Ordered magnesium and potassium protocol  -- Continue IV fluid.  BMP in a.m.    Colon mass status post sigmoidectomy and stoma creation;  -- Postoperative management, diet, DVT prophylaxis, activity level per surgery team  --Consult ostomy wound care nurse for colostomy teaching          Diet: Combination Diet Clear Liquid; Moderate Consistent Carb (60 g CHO per Meal) Diet    DVT Prophylaxis: Enoxaparin (Lovenox) SQ  Stauffer Catheter: PRESENT, indication: /GI/GYN Pelvic Procedure  Lines: None     Cardiac Monitoring: None  Code Status: Full Code      Clinically Significant Risk Factors Present on Admission            # Hypomagnesemia: Lowest Mg = 1.2 mg/dL in last 2 days, will replace as needed       # Hypertension: Home medication list includes antihypertensive(s)      # Overweight: Estimated body mass index is 25.1 kg/m  as calculated  "from the following:    Height as of this encounter: 1.753 m (5' 9\").    Weight as of 8/31/23: 77.1 kg (170 lb).              Disposition Plan      Expected Discharge Date: 09/07/2023                  Henri FERRER MD  Hospitalist Service  [facility]  Securely message with [employer] (more info)  Text page via [employer] Paging/Directory   ______________________________________________________________________    Interval History   Patient is new to me.  Patient seen and examined.  Notes, labs, imaging report personally reviewed.  Patient reported having intermittent sharp abdominal pain at surgical incision site and colostomy site.  However, denied feeling nausea, no vomiting.  Denied feeling fever or chills.  Denies short of breath or chest pain.  Discussed with nursing staffs.    Physical Exam   Vital Signs: Temp: 97.4  F (36.3  C) Temp src: Axillary BP: 95/53 Pulse: 60   Resp: 18 SpO2: 98 % O2 Device: None (Room air) Oxygen Delivery: 6 LPM  Weight: 0 lbs 0 oz      General: Not in obvious distress.  HEENT: Normal cephalic, supple neck  Chest: Clear to auscultation bilateral anteriorly, no wheezing  Heart: S1S2 normal, regular  Abdomen: Soft.  Distended, appropriate tenderness at surgical site, midline dressing with old blood.  Colostomy bag with gas.  Extremities: No legs swelling  Neuro: alert and awake, grossly non-focal      Medical Decision Making             Data     I have personally reviewed the following data over the past 24 hrs:    10.5  \   9.2 (L)   / 179     138 108 (H) 23.3 (H) /  90   3.6 16 (L) 1.36 (H) \       Imaging results reviewed over the past 24 hrs:   No results found for this or any previous visit (from the past 24 hour(s)).  "

## 2023-09-06 NOTE — ANESTHESIA POSTPROCEDURE EVALUATION
Patient: Alok Mcfarland    Procedure: Procedure(s):  COLECTOMY, PARTIAL LAPAROSCOPIC, LAPAROSCOPIC LYSIS OF ADHESIONS  open sigmoidectomy,  ostomy creation       Anesthesia Type:  General    Note:     Postop Pain Control: Uneventful            Sign Out: Well controlled pain   PONV: No   Neuro/Psych: Uneventful            Sign Out: Acceptable/Baseline neuro status   Airway/Respiratory: Uneventful            Sign Out: Acceptable/Baseline resp. status   CV/Hemodynamics: Uneventful            Sign Out: Acceptable CV status; No obvious hypovolemia; No obvious fluid overload   Other NRE: NONE   DID A NON-ROUTINE EVENT OCCUR? No           Last vitals:  Vitals Value Taken Time   /62 09/05/23 1915   Temp 36.1  C (97  F) 09/05/23 1910   Pulse 69 09/05/23 1921   Resp 13 09/05/23 1921   SpO2 96 % 09/05/23 1921   Vitals shown include unvalidated device data.    Electronically Signed By: Spring Paniagua MD  September 5, 2023  11:08 PM

## 2023-09-07 LAB
ANION GAP SERPL CALCULATED.3IONS-SCNC: 6 MMOL/L (ref 7–15)
BUN SERPL-MCNC: 18.6 MG/DL (ref 8–23)
CALCIUM SERPL-MCNC: 7.4 MG/DL (ref 8.8–10.2)
CHLORIDE SERPL-SCNC: 106 MMOL/L (ref 98–107)
CREAT SERPL-MCNC: 1.17 MG/DL (ref 0.67–1.17)
DEPRECATED HCO3 PLAS-SCNC: 23 MMOL/L (ref 22–29)
EGFRCR SERPLBLD CKD-EPI 2021: 67 ML/MIN/1.73M2
GLUCOSE BLDC GLUCOMTR-MCNC: 123 MG/DL (ref 70–99)
GLUCOSE BLDC GLUCOMTR-MCNC: 149 MG/DL (ref 70–99)
GLUCOSE BLDC GLUCOMTR-MCNC: 88 MG/DL (ref 70–99)
GLUCOSE BLDC GLUCOMTR-MCNC: 97 MG/DL (ref 70–99)
GLUCOSE SERPL-MCNC: 92 MG/DL (ref 70–99)
HGB BLD-MCNC: 8.4 G/DL (ref 13.3–17.7)
MAGNESIUM SERPL-MCNC: 1.9 MG/DL (ref 1.7–2.3)
POTASSIUM SERPL-SCNC: 4 MMOL/L (ref 3.4–5.3)
SODIUM SERPL-SCNC: 135 MMOL/L (ref 136–145)

## 2023-09-07 PROCEDURE — 88342 IMHCHEM/IMCYTCHM 1ST ANTB: CPT | Mod: 26 | Performed by: PATHOLOGY

## 2023-09-07 PROCEDURE — 83735 ASSAY OF MAGNESIUM: CPT | Performed by: SURGERY

## 2023-09-07 PROCEDURE — 120N000001 HC R&B MED SURG/OB

## 2023-09-07 PROCEDURE — 80048 BASIC METABOLIC PNL TOTAL CA: CPT | Performed by: INTERNAL MEDICINE

## 2023-09-07 PROCEDURE — 36415 COLL VENOUS BLD VENIPUNCTURE: CPT | Performed by: INTERNAL MEDICINE

## 2023-09-07 PROCEDURE — 250N000013 HC RX MED GY IP 250 OP 250 PS 637: Performed by: SURGERY

## 2023-09-07 PROCEDURE — 85018 HEMOGLOBIN: CPT | Performed by: INTERNAL MEDICINE

## 2023-09-07 PROCEDURE — 88309 TISSUE EXAM BY PATHOLOGIST: CPT | Mod: 26 | Performed by: PATHOLOGY

## 2023-09-07 PROCEDURE — 88341 IMHCHEM/IMCYTCHM EA ADD ANTB: CPT | Mod: 26 | Performed by: PATHOLOGY

## 2023-09-07 PROCEDURE — 250N000011 HC RX IP 250 OP 636: Mod: JZ | Performed by: SURGERY

## 2023-09-07 PROCEDURE — 258N000003 HC RX IP 258 OP 636: Performed by: INTERNAL MEDICINE

## 2023-09-07 PROCEDURE — 99232 SBSQ HOSP IP/OBS MODERATE 35: CPT | Performed by: INTERNAL MEDICINE

## 2023-09-07 PROCEDURE — G0463 HOSPITAL OUTPT CLINIC VISIT: HCPCS

## 2023-09-07 RX ORDER — ACETAMINOPHEN 325 MG/1
650 TABLET ORAL EVERY 12 HOURS PRN
Status: DISCONTINUED | OUTPATIENT
Start: 2023-09-07 | End: 2023-09-08 | Stop reason: HOSPADM

## 2023-09-07 RX ADMIN — ACETAMINOPHEN 975 MG: 325 TABLET ORAL at 21:33

## 2023-09-07 RX ADMIN — SENNOSIDES AND DOCUSATE SODIUM 1 TABLET: 50; 8.6 TABLET ORAL at 21:33

## 2023-09-07 RX ADMIN — SODIUM CHLORIDE: 9 INJECTION, SOLUTION INTRAVENOUS at 03:36

## 2023-09-07 RX ADMIN — ACETAMINOPHEN 975 MG: 325 TABLET ORAL at 13:21

## 2023-09-07 RX ADMIN — ENOXAPARIN SODIUM 40 MG: 40 INJECTION SUBCUTANEOUS at 13:22

## 2023-09-07 RX ADMIN — SENNOSIDES AND DOCUSATE SODIUM 1 TABLET: 50; 8.6 TABLET ORAL at 08:16

## 2023-09-07 RX ADMIN — POLYETHYLENE GLYCOL 3350 17 G: 17 POWDER, FOR SOLUTION ORAL at 08:18

## 2023-09-07 RX ADMIN — GABAPENTIN 100 MG: 100 CAPSULE ORAL at 21:33

## 2023-09-07 RX ADMIN — TRAMADOL HYDROCHLORIDE 50 MG: 50 TABLET, COATED ORAL at 21:33

## 2023-09-07 RX ADMIN — ACETAMINOPHEN 975 MG: 325 TABLET ORAL at 06:30

## 2023-09-07 ASSESSMENT — ACTIVITIES OF DAILY LIVING (ADL)
DEPENDENT_IADLS:: INDEPENDENT
ADLS_ACUITY_SCORE: 20
ADLS_ACUITY_SCORE: 20
ADLS_ACUITY_SCORE: 26
ADLS_ACUITY_SCORE: 26
ADLS_ACUITY_SCORE: 24
ADLS_ACUITY_SCORE: 24
ADLS_ACUITY_SCORE: 22
ADLS_ACUITY_SCORE: 24
ADLS_ACUITY_SCORE: 20
ADLS_ACUITY_SCORE: 20
ADLS_ACUITY_SCORE: 26
ADLS_ACUITY_SCORE: 24

## 2023-09-07 NOTE — PLAN OF CARE
Problem: Plan of Care - These are the overarching goals to be used throughout the patient stay.    Goal: Optimal Comfort and Wellbeing  Outcome: Progressing  Intervention: Monitor Pain and Promote Comfort  Recent Flowsheet Documentation  Taken 9/6/2023 2143 by Romy Freitas RN  Pain Management Interventions: medication (see MAR)     Problem: Pain Acute  Goal: Optimal Pain Control and Function  Outcome: Progressing  Intervention: Develop Pain Management Plan  Recent Flowsheet Documentation  Taken 9/6/2023 2143 by Romy Freitas RN  Pain Management Interventions: medication (see MAR)  Intervention: Prevent or Manage Pain  Recent Flowsheet Documentation  Taken 9/7/2023 0033 by Romy Freitas, MARIA GUADALUPE  Medication Review/Management: medications reviewed  Taken 9/6/2023 2143 by Romy Freitas RN  Medication Review/Management: medications reviewed     Goal Outcome Evaluation: Pt rated pain 5/10. Pain was manage with tramadol. VSS. Afebrile. Colostomy patent and intact with 15ml output. ON Mag, K, and phos protocol. All AM check. Can make needs be known.

## 2023-09-07 NOTE — PLAN OF CARE
[id number]    Goal Outcome Evaluation:  Stauffer removed at 0830  1350: Voided missed the hat.   Voiding freely. PVR 0.   Ambulating on the hallways with a walker independently X4.  Colostomy teaching / education to Patient and spouse  by Woc/Ostomy nurse . Good appetite.  Blood glucose 88,149, and 123 no correction bolus given  Tele and IVF discontinued.

## 2023-09-07 NOTE — PLAN OF CARE
Problem: Plan of Care - These are the overarching goals to be used throughout the patient stay.    Goal: Optimal Comfort and Wellbeing  9/7/2023 0646 by Romy Freitas, MARIA GUADALUPE  Outcome: Progressing  9/7/2023 0036 by Romy Freitas, MARIA GUADALUPE  Outcome: Progressing  Intervention: Monitor Pain and Promote Comfort  Recent Flowsheet Documentation  Taken 9/6/2023 2143 by Romy Freitas, RN  Pain Management Interventions: medication (see MAR)     Problem: Risk for Delirium  Goal: Improved Sleep  Outcome: Progressing     Goal Outcome Evaluation: Pt slept between cares. Stated that tramadol from evening shift was really helpful and he didn't need any overnight. VSS. Afebrile. Colostomy patent and intact with 50 ml of tan brown output. Stauffer patent and intact with an output of 200ml. ON Mag, K, and phos protocol. All AM check. Can make needs be known.

## 2023-09-07 NOTE — PROGRESS NOTES
"[facility]    Medicine Progress Note - Hospitalist Service    Date of Admission:  9/5/2023    Assessment & Plan   72-year-old female with past medical history of CKD 3, hypertension, hyperlipidemia, DM2 and recent diagnosis of colon mass who underwent elective open sigmoidectomy with stoma creation and admitted.  Hospitalist medicine service consulted for medical management.    History of essential hypertension;  Postoperative hypotension; resolved  -- Did received IV fluid bolus, taper off continuous IV fluid as blood pressure stable and patient on oral diet.  -- Blood pressure improving.  Continue PTA atenolol with holding parameters.  Continue to hold PTA lisinopril and amlodipine for today. Monitor vitals and adjust medications accordingly    Acute on chronic anemia, ABLA postoperative and hemodilution;  -- Preop hemoglobin 11.8.  Hemoglobin trending down, 8.4 today.  --Monitor hemoglobin closely, transfuse per surgery protocol    Type II DM;  -- Plan to resume PTA metformin on discharge  --Insulin sliding scale hypoglycemic protocol    CKD stage III; creatinine improving  Metabolic acidosis; improved  Hypomagnesemia, improved  -- Taper off IV fluid    Colon mass status post sigmoidectomy and stoma creation;  -- Postoperative management, diet, DVT prophylaxis, activity level per surgery team  --Consult ostomy wound care nurse for colostomy teaching          Diet: Moderate Consistent Carb (60 g CHO per Meal) Diet    DVT Prophylaxis: Defer to primary service  Stauffer Catheter: Not present  Lines: None     Cardiac Monitoring: None  Code Status: Full Code      Clinically Significant Risk Factors            # Hypomagnesemia: Lowest Mg = 1.2 mg/dL in last 2 days, will replace as needed              # Overweight: Estimated body mass index is 25.1 kg/m  as calculated from the following:    Height as of this encounter: 1.753 m (5' 9\").    Weight as of 8/31/23: 77.1 kg (170 lb).  , PRESENT ON " ADMISSION            Disposition Plan     Expected Discharge Date: 09/07/2023                  Henri FERRER MD  Hospitalist Service  [facility]  Securely message with [employer] (more info)  Text page via [email or web address] Paging/Directory   ______________________________________________________________________    Interval History   Patient seen and examined.  Notes, labs, imaging report personally reviewed.  No acute issues reported overnight.  Patient reported abdominal pain fairly controlled.  Denied nausea or vomiting.  Denied fever or chills.  Denied feeling dizziness when sitting upright.  Patient has not been out of bed today.  Discussed with nursing staffs.  Discussed with surgery team.    Physical Exam   Vital Signs: Temp: 97.7  F (36.5  C) Temp src: Oral BP: 110/60 Pulse: 58   Resp: 20 SpO2: 97 % O2 Device: None (Room air)    Weight: 0 lbs 0 oz      General: Not in obvious distress.  HEENT: Normocephalic, supple neck  Chest: Clear to auscultation bilateral anteriorly, no wheezing  Heart: S1S2 normal, regular  Abdomen: Soft.  Mild distention, appropriate tenderness, colostomy bag in place with brownish loose stool  Extremities: No legs swelling  Neuro: alert and awake, grossly non-focal      Medical Decision Making             Data     I have personally reviewed the following data over the past 24 hrs:    N/A  \   8.4 (L)   / N/A     135 (L) 106 18.6 /  88   4.0 23 1.17 \       Imaging results reviewed over the past 24 hrs:   No results found for this or any previous visit (from the past 24 hour(s)).

## 2023-09-07 NOTE — PROGRESS NOTES
Alok Mcfarland is doing well, tolerating diet, ambulated yesterday.          Vitals:    09/06/23 1550 09/06/23 2300 09/07/23 0630 09/07/23 0747   BP: 101/61 124/68  110/60   BP Location: Left arm Left arm  Right arm   Pulse: 73 63  58   Resp: 16 16  20   Temp: 97.8  F (36.6  C) 97.8  F (36.6  C) 97.8  F (36.6  C) 97.7  F (36.5  C)   TempSrc: Oral Oral  Oral   SpO2: 98% 97%  97%   Height:           PHYSICAL EXAM:  GEN: No acute distress, comfortable  ABD:soft, mild distention, midline incision clean with blue vessel loop, stoma patent  EXT:No cyanosis, edema or obvious abnormalities        Recent Labs   Lab 09/07/23  0615 09/06/23  0608   WBC  --  10.5   HGB 8.4* 9.2*   HCT  --  30.0*   PLT  --  179       Recent Labs   Lab 09/07/23  0615   *   CO2 23   BUN 18.6         A/P:  Alok Mcfarland is s/p sigmoidectomy  -for reasons unclear, the abernathy was left in overnight.  I have asked the charge nurse to ensure it is removed this morning.   -continue to ambulate  -pain control  -regular diet  -pathology pending, preop CEA noted    Thor Fall DO  FACS  [phone]  [facility] Department of Surgery

## 2023-09-07 NOTE — CONSULTS
Care Management Initial Consult    General Information  Assessment completed with: Patient, Spouse or significant other, Kathy (spouse)  Type of CM/SW Visit: Initial Assessment    Primary Care Provider verified and updated as needed: Yes (Dr. Marquita Rodrigez)   Readmission within the last 30 days: no previous admission in last 30 days      Reason for Consult: discharge planning  Advance Care Planning: Advance Care Planning Reviewed: no concerns identified          Communication Assessment  Patient's communication style: spoken language (English or Bilingual)    Hearing Difficulty or Deaf: no   Wear Glasses or Blind: no    Cognitive  Cognitive/Neuro/Behavioral: WDL  Level of Consciousness: alert  Arousal Level: arouses to voice  Orientation: oriented x 4             Living Environment:   People in home: spouse  Kathy  Current living Arrangements: house      Able to return to prior arrangements: yes  Living Arrangement Comments: split level    Family/Social Support:  Care provided by: self  Provides care for: pet(s)  Marital Status:   Wife  Ricky       Description of Support System: Supportive, Involved    Support Assessment: Adequate family and caregiver support    Current Resources:   Patient receiving home care services: No     Community Resources: None  Equipment currently used at home: none  Supplies currently used at home: None    Employment/Financial:  Employment Status:          Financial Concerns:             Does the patient's insurance plan have a 3 day qualifying hospital stay waiver?  No    Lifestyle & Psychosocial Needs:  Social Determinants of Health     Tobacco Use: Low Risk  (9/5/2023)    Patient History     Smoking Tobacco Use: Never     Smokeless Tobacco Use: Never     Passive Exposure: Not on file   Alcohol Use: Not on file   Financial Resource Strain: Not on file   Food Insecurity: Not on file   Transportation Needs: Not on file   Physical Activity: Not on file   Stress: Not on file   Social  Connections: Not on file   Intimate Partner Violence: Not on file   Depression: Not on file   Housing Stability: Not on file       Functional Status:  Prior to admission patient needed assistance:   Dependent ADLs:: Independent  Dependent IADLs:: Independent       Mental Health Status:          Chemical Dependency Status:                Values/Beliefs:  Spiritual, Cultural Beliefs, Latter day Practices, Values that affect care:                 Additional Information:  CM met with patient and patient's wife, Kathy, in patient's room.  Introduced self and explained role.  Patient lives in a split level house with wife.  Patient independent with all I/ADLs and drives.  No services or equipment.     Anticipate home with home care nurse for new ostomy and PT/OT for weakness (pending eval and recommendations).    CM will continue to monitor medical progression and aide in discharge planning.      Malissa Pitts RN

## 2023-09-07 NOTE — DISCHARGE INSTRUCTIONS
[facility]   WO Nurse Discharge Instructions for New Ileostomy or New Colostomy      When you get home  If you have questions related to your ostomy, please call the [facility] nurses and leave a message at the number below.  [facility]     [phone]    - Supplies- Upon discharge from the hospital you will be sent home with ostomy supplies.      If you have been set up for home care visits the home care nurse will help you coordinate ordering supplies.    If you have not been set up for home care, then make sure to make a follow up appointment with the WO nurse to reassess your abdomen and ostomy for changes and determine the correct plan.  At that time ordering supplies will be discussed.       Pouching    1. Change appliance 2x / wk and as needed for any leakage.   2. Empty pouch when no more than 1/3 to 1/2 full (solid, liquid, gas)  3. May shower with pouch on or off, removing pouch/ barrier down to the skin is ok. Just note that you cannot control output so there may occasionally be clean up if you choose to shower / bathe without a pouch on.     The pouching procedure:   1.  Use the  Pouch Change Instruction  sheet to gather and organize supplies  2.  Trace old pattern onto new pouch and cut out new opening on new barrier.  3.  Remove old pouch, observe for any leakage  4.  Clean skin with water and paper towel   5.  Apply Ring around stoma  6.   Apply prepared barrier to the clean skin and press into place.  7.  Hold hand on pouch/ over stoma to warm pouch into place for 2-5 minutes      Your Pouching Plan / Supplies                      NOTE:  Once your supply company has been determined call your supply company with supply issues    POUCH / BARRIER   BRAND of ostomy supplies:  Cesar  From [facility] - cut at 1 1/2 inch leighann when using  Pouch: 59750; Skin Barrier 53533  Rin    Samples (bigger cutting surface) - cut at 40 mm leighann when using  Pouch: 29635;  Skin Barrier [id number]   Rin     Follow up    1. Physician - follow instructions from MD for follow ups with them  Contact your physician if you have the following signs or symptoms:  Pain in your incision that is not relieved by a short rest or ordered pain medications  Chills or temperature of 101 or higher  Redness or swelling in your incision    2. [facility] (ostomy nurses)            Please come about 10 minutes early to complete the registration process.  Go to [facility] - enter the main doors.  Tell the person at the desk you are there for the ostomy clinic.  They will bring you to registration.  Once registration is completed, you will be seen by the ostomy nurse.    Please call your surgeon, Dr. Fall, at [phone] to schedule a follow-up appointment regarding follow-up discussions and or/visits relating to surgery.  You may also call if you have any questions or concerns regarding her hospital stay and any other surgical issue you may have.

## 2023-09-07 NOTE — PROGRESS NOTES
"[facility] Nurse Inpatient Assessment     Consulted for: colostomy    Patient History (according to provider note(s):      Andra Mercado APRN CNP   Nurse Practitioner  Surgery     Progress Notes      Signed     Date of Service: 9/6/2023 11:34 AM  Creation Time: 9/6/2023 11:34 AM       ASSESSMENT:  No diagnosis found.     Alok Mcfarland is a 72 year old male who is s/p open sigmoidectomy with colostomy creation. He is already showing good signs of bowel function this morning with flatus and feculent matter in the ostomy bag. He still feels weak/unsteady and this preceded surgery. Will have PT/OT evaluate him for safety before discharging home.     PLAN:  Await pathology  Diabetic diet  WO consult for new colostomy  PT/OT for safety eval  Hopefully home tomorrow if doing well     SUBJECTIVE:   He is feeling well. He has not been out of bed since surgery. He is worrying about getting up and walking as he feels so weak and is unsteady at times. He has been feeling this way for the past week before coming into the hospital. He has not felt it since surgery but attributes this to his inactivity. He denies nausea and vomiting; is tolerating clear liquids. He states that the nurses had to empty his ostomy bag this morning \"before it exploded\".           Assessment:      Assessment of new end Colostomy:  Diagnosis Pertinent to Stoma: Cancer - Colon or Rectum      Surgery Date: 9/5  [facility]       [facility]: [facility]  Pouching system in place on assessment today: Hurley one piece and flat   Pouch barrier status: intact and Minimal melting  Pouch last changed/wear time: 1 day  Reason for pouch change today: ostomy education and initial post-op assessment  Effectiveness of current pouching/ supply plan: Attempting new system, will re-evaluate next assessment  Change made with ostomy management today: Yes  Pouching system placed today: Cesar two piece, cut to fit, " convex, barrier ring, and skin prep   Supplies: gathered      Last photo: 9/6  Stoma location: LLQ  Stoma size: 38mm ,   Stoma appearance: viable, red, oval, flat, and venous congestion to the perimeter, MD aware  Mucocutaneous junction:  intact, os at 11 oclock and a small dimple to this area  Peristomal complication(s): none   Output: liquid and brown  Output volume emptied during visit: 50 ml  Abdominal assessment: Soft  Surgical site(s): open to air  NG still in place? No  Pain: Dull ache  Is patient still on a PCA? No    Ostomy education assessment:  Participant of teaching session today: patient  and spouse  Education completed today: Initial fitting, Stoma assessment, Pouching system assessment , Refitting of appliance , Adjustment of pouching plan, Pouch change demonstration, Ostomy accessory product use , Introduction to pouches, Pouch emptying demonstration, Pouch emptying return demonstration, Importance of hydration, When to seek medical attention, and Low fiber diet   Educational materials/methods: Verbal, Written, Demonstration,  hand out, FOD Medora education hand out, Product sample, Addressed patient/caregiver questions/concerns, and Left packet of information at bedside   Education still needed: Pouch change return demonstration, Peristomal skin care, Intake and output recording, Fluid and electrolyte balance , Odor/flatus management , Infection prevention/hygiene , Hernia prevention, Lifestyle adjustments , and Discharge instructions  Learning Comprehension:   Psychosocial assessment: wife at bedside  Patient readiness for education today: lethargic and observing  Following today's visit: patient  and spouse is able to demonstrate;         1. How to empty their pouch? Yes and with minimal assist        2. How to change their pouch? Demo provided         3. How to read and record intake and output correctly? Demo provided   Preparation for discharge completed: Placed prescription  "recommendations in discharge navigator for MD to sign, Ensured patient has extra supplies for discharge, and Discussed how to order supplies after discharge   Preparation for discharge still needed: Ordered samples from  after gaining consent from patient/caregiver and Discuss signs/symptoms of when to seek medical attention  Pt support system on discharge: wife  WOC recommend home care? Yes  Face to face time: 35 minutes    Treatment Plan:     LUQ Colostomy pouching plan:   Pouching system: ostomy supplies pouches: Cesar 57 FECAL ([id number]) ostomy supplies barrier: [place] 57mm SOFT CONVEX ([id number])  Accessories used: [facility] ostomy accessories: 2\" Cera Barrier Ring ([id number]) and Cavilon no sting barrier film ([id number])   Frequency of pouch changes: Twice weekly and PRN leakage  WOC follow up plan: Daily Monday-Friday (as able)  Bedside RN interventions: Change pouch PRN if leaking using the supplies above, Empty pouch when 1/3 to 1/2 full, ensure to clean pouch outlet after emptying to prevent odor, Notify WOC for ongoing pouch leakage, Document stoma appearance and output volume, color, and consistency every shift, Encourage patient to empty pouch with assist, and Assist patient to measure and record output      Orders: Reviewed    RECOMMEND PRIMARY TEAM ORDER: None, at this time  Education provided: plan of care  Discussed plan of care with: Patient and Family  WOC nurse follow-up plan: daily  Notify WOC if wound(s) deteriorate.  Nursing to notify the Provider(s) and re-consult the WOC Nurse if new skin concern.    DATA:     Current support surface: Standard  Standard gel/foam mattress (IsoFlex, Atmos air, etc)  Containment of urine/stool: Incontinence Protocol  BMI: Body mass index is 25.1 kg/m .   Active diet order: Orders Placed This Encounter      Moderate Consistent Carb (60 g CHO per Meal) Diet     Output: I/O last 3 completed shifts:  In: 1881 [P.O.:780; I.V.:1101]  Out: 615 [Urine:550; Stool:65] "     Labs:   Recent Labs   Lab 09/07/23  0615 09/06/23  0608   HGB 8.4* 9.2*   WBC  --  10.5       Pressure injury risk assessment:   Sensory Perception: 4-->no impairment  Moisture: 4-->rarely moist  Activity: 3-->walks occasionally  Mobility: 3-->slightly limited  Nutrition: 2-->probably inadequate  Friction and Shear: 3-->no apparent problem  Jeison Score: 19    Vy Gonsalves, MSN RN CWOCN  Pager no longer is use, please contact through [employer] group: [facility] [employer]

## 2023-09-08 ENCOUNTER — APPOINTMENT (OUTPATIENT)
Dept: OCCUPATIONAL THERAPY | Facility: HOSPITAL | Age: 73
DRG: 330 | End: 2023-09-08
Attending: INTERNAL MEDICINE
Payer: COMMERCIAL

## 2023-09-08 ENCOUNTER — APPOINTMENT (OUTPATIENT)
Dept: PHYSICAL THERAPY | Facility: HOSPITAL | Age: 73
DRG: 330 | End: 2023-09-08
Attending: INTERNAL MEDICINE
Payer: COMMERCIAL

## 2023-09-08 VITALS
DIASTOLIC BLOOD PRESSURE: 71 MMHG | BODY MASS INDEX: 25.1 KG/M2 | TEMPERATURE: 97.5 F | HEIGHT: 69 IN | HEART RATE: 63 BPM | OXYGEN SATURATION: 98 % | RESPIRATION RATE: 18 BRPM | SYSTOLIC BLOOD PRESSURE: 149 MMHG

## 2023-09-08 LAB
GLUCOSE BLDC GLUCOMTR-MCNC: 104 MG/DL (ref 70–99)
GLUCOSE BLDC GLUCOMTR-MCNC: 91 MG/DL (ref 70–99)
GLUCOSE BLDC GLUCOMTR-MCNC: 95 MG/DL (ref 70–99)
HGB BLD-MCNC: 9.2 G/DL (ref 13.3–17.7)
PATH REPORT.ADDENDUM SPEC: ABNORMAL
PATH REPORT.COMMENTS IMP SPEC: ABNORMAL
PATH REPORT.COMMENTS IMP SPEC: ABNORMAL
PATH REPORT.COMMENTS IMP SPEC: YES
PATH REPORT.FINAL DX SPEC: ABNORMAL
PATH REPORT.GROSS SPEC: ABNORMAL
PATH REPORT.MICROSCOPIC SPEC OTHER STN: ABNORMAL
PATH REPORT.RELEVANT HX SPEC: ABNORMAL
PATHOLOGY SYNOPTIC REPORT: ABNORMAL
PHOTO IMAGE: ABNORMAL
PLATELET # BLD AUTO: 160 10E3/UL (ref 150–450)
POTASSIUM SERPL-SCNC: 4 MMOL/L (ref 3.4–5.3)

## 2023-09-08 PROCEDURE — 84132 ASSAY OF SERUM POTASSIUM: CPT | Performed by: SURGERY

## 2023-09-08 PROCEDURE — 250N000011 HC RX IP 250 OP 636: Mod: JZ | Performed by: SURGERY

## 2023-09-08 PROCEDURE — 97166 OT EVAL MOD COMPLEX 45 MIN: CPT | Mod: GO

## 2023-09-08 PROCEDURE — 85049 AUTOMATED PLATELET COUNT: CPT | Performed by: SURGERY

## 2023-09-08 PROCEDURE — 85018 HEMOGLOBIN: CPT | Performed by: INTERNAL MEDICINE

## 2023-09-08 PROCEDURE — 250N000013 HC RX MED GY IP 250 OP 250 PS 637: Performed by: INTERNAL MEDICINE

## 2023-09-08 PROCEDURE — 36415 COLL VENOUS BLD VENIPUNCTURE: CPT | Performed by: SURGERY

## 2023-09-08 PROCEDURE — 97116 GAIT TRAINING THERAPY: CPT | Mod: GP

## 2023-09-08 PROCEDURE — 97535 SELF CARE MNGMENT TRAINING: CPT | Mod: GO

## 2023-09-08 PROCEDURE — 99232 SBSQ HOSP IP/OBS MODERATE 35: CPT | Performed by: INTERNAL MEDICINE

## 2023-09-08 PROCEDURE — 97161 PT EVAL LOW COMPLEX 20 MIN: CPT | Mod: GP

## 2023-09-08 PROCEDURE — G0463 HOSPITAL OUTPT CLINIC VISIT: HCPCS

## 2023-09-08 PROCEDURE — 250N000013 HC RX MED GY IP 250 OP 250 PS 637: Performed by: SURGERY

## 2023-09-08 RX ORDER — TRAMADOL HYDROCHLORIDE 50 MG/1
50 TABLET ORAL EVERY 6 HOURS PRN
Qty: 8 TABLET | Refills: 0 | Status: SHIPPED | OUTPATIENT
Start: 2023-09-08 | End: 2024-04-15

## 2023-09-08 RX ORDER — ACETAMINOPHEN 325 MG/1
975 TABLET ORAL EVERY 6 HOURS PRN
COMMUNITY
Start: 2023-09-08

## 2023-09-08 RX ORDER — LISINOPRIL 5 MG/1
10 TABLET ORAL DAILY
Status: DISCONTINUED | OUTPATIENT
Start: 2023-09-08 | End: 2023-09-08 | Stop reason: HOSPADM

## 2023-09-08 RX ORDER — AMLODIPINE BESYLATE 10 MG/1
10 TABLET ORAL DAILY
Start: 2023-09-14

## 2023-09-08 RX ADMIN — ATENOLOL 50 MG: 25 TABLET ORAL at 08:47

## 2023-09-08 RX ADMIN — SENNOSIDES AND DOCUSATE SODIUM 1 TABLET: 50; 8.6 TABLET ORAL at 08:49

## 2023-09-08 RX ADMIN — ENOXAPARIN SODIUM 40 MG: 40 INJECTION SUBCUTANEOUS at 13:19

## 2023-09-08 RX ADMIN — LISINOPRIL 10 MG: 5 TABLET ORAL at 08:47

## 2023-09-08 RX ADMIN — POLYETHYLENE GLYCOL 3350 17 G: 17 POWDER, FOR SOLUTION ORAL at 08:46

## 2023-09-08 RX ADMIN — ACETAMINOPHEN 975 MG: 325 TABLET ORAL at 13:17

## 2023-09-08 RX ADMIN — ACETAMINOPHEN 975 MG: 325 TABLET ORAL at 05:17

## 2023-09-08 ASSESSMENT — ACTIVITIES OF DAILY LIVING (ADL)
ADLS_ACUITY_SCORE: 20

## 2023-09-08 NOTE — PROGRESS NOTES
ASSESSMENT:  1. Attention to colostomy (H) [Z43.3 (ICD-10-CM)]    2. Essential hypertension, benign        lAok Mcfarland is a 72 year old male who is s/p open sigmoidectomy with colostomy creation. He is doing very well and ready to go home. He will be able to go today after Dr. Fall stops to see him this afternoon.     Pathology is back. Answered basic questions about his pathology results, but instructed patient to wait for more detailed answers from Dr. Fall.    PLAN:  Home later today  Discharge recommendations/instructions placed    SUBJECTIVE:   He is doing well. Tolerating a regular diet, having good output from his stoma, voiding and pain is controlled.      Patient Vitals for the past 24 hrs:   BP Temp Temp src Pulse Resp SpO2   09/08/23 0721 (!) 149/71 97.5  F (36.4  C) Oral 63 18 98 %   09/07/23 2318 131/70 97.5  F (36.4  C) Oral 67 18 97 %   09/07/23 1534 118/72 97.5  F (36.4  C) Oral 72 20 99 %         PHYSICAL EXAM:  GEN: No acute distress, comfortable  LUNGS: CTA bilaterally  CV:RRR  ABD:soft, not tender, not distended; stoma well perfused with gas and fecal matter in the ostomy device; vessel loop intact  EXT:No cyanosis, edema or obvious abnormalities    09/07 0700 - 09/08 0659  In: 843 [P.O.:840; I.V.:3]  Out: 1275 [Urine:1000]    Admission on 09/05/2023   Component Date Value    GLUCOSE BY METER POCT 09/05/2023 109 (H)     CEA 09/05/2023 1.1     Case Report 09/05/2023                      Value:Surgical Pathology Report                         Case: [id number]                                  Authorizing Provider:  Thor Fall DO   Collected:           09/05/2023 04:34 PM          Ordering Location:     [facility]      Received:            09/06/2023 08:14 AM                                 [facility]                                                               Pathologist:           Janine King MD                                                       Specimen:    Large Intestine, Colon, Sigmoid, SIGMOID COLON; SUTURE MARKED PROXIMAL MARGIN              Addendum 09/05/2023                      Value:This result contains rich text formatting which cannot be displayed here.    Final Diagnosis 09/05/2023                      Value:This result contains rich text formatting which cannot be displayed here.    Synoptic Checklist 09/05/2023                      Value:COLON AND RECTUM: Resection, Including Transanal Disk Excision of Rectal Neoplasms                            COLON AND RECTUM: RESECTION - All Specimens                            8th Edition - Protocol posted: 6/22/2022                                                        SPECIMEN                               Procedure:    Sigmoidectomy                                                         TUMOR                               Tumor Site:    Sigmoid colon                                Histologic Type:    Adenocarcinoma                                Histologic Grade:    G2, moderately differentiated                                Tumor Size:    Greatest dimension (Centimeters): 8.7 cm                               Tumor Extent:    Invades through muscularis propria into the pericolonic or perirectal tissue                                Macroscopic Tumor Perforation:    Not identified                                Lymphovascular Invasion:    Not identified                                Perineural Invasion:    Not identified                                Type of Polyp in which Invasive Carcinoma Arose:    Tubular adenoma                                Treatment Effect:    No known presurgical therapy                                                         MARGINS                               Margin Status for Invasive Carcinoma:    All margins negative for invasive carcinoma                                  Closest Margin(s) to Invasive Carcinoma:    Radial (circumferential) or mesenteric: 4.3                                   Distance from Invasive Carcinoma to Radial (Circumferential) Margin:    Distance already reported as closest margin                                Margin Status for Non-Invasive Tumor:    All margins negative for high-grade dysplasia / intramucosal carcinoma and low-grade dysplasia                                                         REGIONAL LYMPH NODES                               Regional Lymph Node Status:                                     :    All regional lymph nodes negative for tumor                                  Number of Lymph Nodes Examined:    42                                Tumor Deposits:    Not identified                                                         PATHOLOGIC STAGE CLASSIFICATION (pTNM, AJCC 8th Edition)                               Reporting of pT, pN, and (when applicable) pM categories is based on information available to the pathologist at the time the report is issued. As per the AJCC (Chapter 1, 8th Ed.) it is the managing physician's responsibility to establish the final pathologic stage based upon all pertinent information, including but potentially not limited to this pathology report.                               pT Category:    pT3                                pN Category:    pN0                                                         ADDITIONAL FINDINGS                               Additional Findings:    Diverticulosis                                                            Comment(s):    Mismatch repair immunohistochemical stains are pending on block A14 and will be reported in an addendum                             Colon and Rectum Biomarker Reporting Template                            (Added in Addendum) COLON AND RECTUM: BIOMARKER REPORTING TEMPLATE - All Specimens                            Protocol posted: 6/30/2021                                                        RESULTS                               Mismatch  Repair:                                     Immunohistochemistry (IHC) Testing for Mismatch Repair (MMR) Proteins:                                       MLH1 Result:    Intact nuclear expression                                  Immunohistochemistry (IHC) Testing for Mismatch Repair (MMR) Proteins:                                       MSH2 Result:    Intact nuclear expression                                  Immunohistochemistry (IHC) Testing for Mismatch Repair (MMR) Proteins:                                       MSH6 Result:    Intact nuclear expression                                  Immunohistochemistry (IHC) Testing for Mismatch Repair (MMR) Proteins:                                       PMS2 Result:    Intact nuclear expression                                  Immunohistochemistry (IHC) Testing for Mismatch Repair (MMR) Proteins:    Background nonneoplastic tissue / internal control with intact nuclear expression                                    IHC Interpretation:    No loss of nuclear expression of MMR proteins: low probability of MSI-H       Clinical Information 09/05/2023                      Value:This result contains rich text formatting which cannot be displayed here.    Gross Description 09/05/2023                      Value:This result contains rich text formatting which cannot be displayed here.    Microscopic Description 09/05/2023                      Value:This result contains rich text formatting which cannot be displayed here.    MCRS 09/05/2023 Yes (A)     Performing Labs 09/05/2023                      Value:This result contains rich text formatting which cannot be displayed here.    GLUCOSE BY METER POCT 09/05/2023 113 (H)     GLUCOSE BY METER POCT 09/05/2023 143 (H)     Hemoglobin 09/05/2023 9.5 (L)     Sodium 09/06/2023 138     Potassium 09/06/2023 3.6     Chloride 09/06/2023 108 (H)     Carbon Dioxide (CO2) 09/06/2023 16 (L)     Anion Gap 09/06/2023 14     Urea Nitrogen 09/06/2023  23.3 (H)     Creatinine 09/06/2023 1.36 (H)     Calcium 09/06/2023 7.5 (L)     Glucose 09/06/2023 104 (H)     GFR Estimate 09/06/2023 56 (L)     WBC Count 09/06/2023 10.5     RBC Count 09/06/2023 4.54     Hemoglobin 09/06/2023 9.2 (L)     Hematocrit 09/06/2023 30.0 (L)     MCV 09/06/2023 66 (L)     MCH 09/06/2023 20.3 (L)     MCHC 09/06/2023 30.7 (L)     RDW 09/06/2023 19.4 (H)     Platelet Count 09/06/2023 179     GLUCOSE BY METER POCT 09/06/2023 90     Magnesium 09/06/2023 1.2 (L)     Phosphorus 09/06/2023 4.1     GLUCOSE BY METER POCT 09/06/2023 118 (H)     Magnesium 09/06/2023 2.3     GLUCOSE BY METER POCT 09/06/2023 108 (H)     Potassium 09/06/2023 4.3     GLUCOSE BY METER POCT 09/06/2023 148 (H)     Hemoglobin 09/07/2023 8.4 (L)     Sodium 09/07/2023 135 (L)     Potassium 09/07/2023 4.0     Chloride 09/07/2023 106     Carbon Dioxide (CO2) 09/07/2023 23     Anion Gap 09/07/2023 6 (L)     Urea Nitrogen 09/07/2023 18.6     Creatinine 09/07/2023 1.17     Calcium 09/07/2023 7.4 (L)     Glucose 09/07/2023 92     GFR Estimate 09/07/2023 67     Magnesium 09/07/2023 1.9     GLUCOSE BY METER POCT 09/07/2023 88     GLUCOSE BY METER POCT 09/07/2023 149 (H)     GLUCOSE BY METER POCT 09/07/2023 123 (H)     GLUCOSE BY METER POCT 09/07/2023 97     Platelet Count 09/08/2023 160     Potassium 09/08/2023 4.0     Hemoglobin 09/08/2023 9.2 (L)     GLUCOSE BY METER POCT 09/08/2023 91     GLUCOSE BY METER POCT 09/08/2023 95           VANDANA Marrero CNP

## 2023-09-08 NOTE — PROGRESS NOTES
[facility]    Medicine Progress Note - Hospitalist Service    Date of Admission:  9/5/2023    Assessment & Plan   72-year-old female with past medical history of CKD 3, hypertension, hyperlipidemia, DM2 and recent diagnosis of colon mass who underwent elective open sigmoidectomy with stoma creation and admitted.  Hospitalist medicine service consulted for medical management.    History of essential hypertension;  Postoperative hypotension; resolved  -- On postop day, received IV fluid bolus, tapered off continuous IV fluid as blood pressure stable and patient on oral diet.  -Now blood pressure running high, continue PTA atenolol, resumed PTA lisinopril.  Advised to hold amlodipine for few days.  Patient reported having blood pressure medicine at home, okay to resume amlodipine if systolic blood pressure 130 range.    Acute on chronic anemia, ABLA postoperative and hemodilution;  -- Preop hemoglobin 11.8.  Hemoglobin trending down, 8.4 yesterday and today hemoglobin 9 range.  --Monitor hemoglobin closely, transfuse per surgery protocol    Type II DM;  --Resume PTA metformin on discharge  --Insulin sliding scale and hypoglycemic protocol while inpatient    CKD stage III; creatinine improving  Metabolic acidosis; improved  Hypomagnesemia, improved  -- Tapered off IV fluid    Colon mass status post sigmoidectomy and stoma creation;  -- Postoperative management, diet, DVT prophylaxis, activity level per surgery team  --Consult ostomy wound care nurse for colostomy teaching    Medical order placed for discharge          Diet: Moderate Consistent Carb (60 g CHO per Meal) Diet    DVT Prophylaxis: Defer to primary service  Stauffer Catheter: Not present  Lines: None     Cardiac Monitoring: None  Code Status: Full Code      Clinically Significant Risk Factors                         # Overweight: Estimated body mass index is 25.1 kg/m  as calculated from the following:    Height as of this encounter:  "1.753 m (5' 9\").    Weight as of 8/31/23: 77.1 kg (170 lb)., PRESENT ON ADMISSION            Disposition Plan      Expected Discharge Date: 09/08/2023      Destination: home with help/services  Discharge Comments: Need PT/OT shaun FERRER MD  Hospitalist Service  [facility]  Securely message with [employer] (more info)  Text page via [employer] Paging/Directory   ______________________________________________________________________    Interval History   Patient seen and examined.  Notes, labs, imaging report personally reviewed. Patient reported ambulating with therapy this morning and denied new concerns or complaints.  Patient reported postoperative pain well controlled.  Discussed with patient regarding home blood pressure medications management.    Physical Exam   Vital Signs: Temp: 97.5  F (36.4  C) Temp src: Oral BP: (!) 149/71 Pulse: 63   Resp: 18 SpO2: 98 % O2 Device: None (Room air)    Weight: 0 lbs 0 oz      General: Not in obvious distress.  HEENT: Normocephalic, supple neck  Chest: Clear to auscultation bilateral anteriorly, no wheezing  Heart: S1S2 normal, regular  Abdomen: Soft.  Mild distention, appropriate tenderness.  Colostomy bag with brown stool.  Extremities: No legs swelling  Neuro: alert and awake, grossly non-focal      Medical Decision Making             Data     I have personally reviewed the following data over the past 24 hrs:    N/A  \   9.2 (L)   / 160     N/A N/A N/A /  95   4.0 N/A N/A \       Imaging results reviewed over the past 24 hrs:   No results found for this or any previous visit (from the past 24 hour(s)).  "

## 2023-09-08 NOTE — PROGRESS NOTES
Care Management Discharge Note    Discharge Date: 09/08/2023       Discharge Disposition: Home Care    Discharge Services: None    Discharge DME: None    Discharge Transportation: family or friend will provide    Private pay costs discussed: Not applicable    Does the patient's insurance plan have a 3 day qualifying hospital stay waiver?  No    PAS Confirmation Code:  NA  Patient/family educated on Medicare website which has current facility and service quality ratings:  NA    Education Provided on the Discharge Plan: Yes  Persons Notified of Discharge Plans: per care team  Patient/Family in Agreement with the Plan: yes    Handoff Referral Completed: Yes    Additional Information:  Patient discharging with home care through [facility] nurse and OT.  New ostomy.     Family to transport.    Paged MDs for home care discharge orders.  Faxed discharge orders to [facility].    No further CM needs anticipated.       Malissa Pitts RN

## 2023-09-08 NOTE — PLAN OF CARE
Problem: Plan of Care - These are the overarching goals to be used throughout the patient stay.    Goal: Plan of Care Review  Description: The Plan of Care Review/Shift note should be completed every shift.  The Outcome Evaluation is a brief statement about your assessment that the patient is improving, declining, or no change.  This information will be displayed automatically on your shift note.  Outcome: Progressing   Goal Outcome Evaluation:         Patient is alert and oriented. Slept between cares. Post op day 3. ACHS, 2am check of 91. Vital signs stable. Independent in room. Mg and K protocol. Denies pain. Surgical sites are clean, dry and intact. Right PIV. Colostomy pouch leaked requiring bedding change at 5:30am. Patient would like to ambulate to the bathroom rather than use the urinal.   Orquidea Rojas RN

## 2023-09-08 NOTE — PROGRESS NOTES
09/08/23 1045   Appointment Info   Signing Clinician's Name / Credentials (PT) Bridget Riley PT   Living Environment   People in Home spouse   Current Living Arrangements house   Home Accessibility stairs within home   Number of Stairs, Within Home, Primary greater than 10 stairs   Stair Railings, Within Home, Primary railings safe and in good condition   Transportation Anticipated family or friend will provide   Self-Care   Current Activity Tolerance moderate   Equipment Currently Used at Home none  (has access to FWW at home)   Activity/Exercise/Self-Care Comment indep all ADL's at baseline   General Information   Onset of Illness/Injury or Date of Surgery 09/05/23   Referring Physician Henri FERRER   Patient/Family Therapy Goals Statement (PT) return home   Pertinent History of Current Problem (include personal factors and/or comorbidities that impact the POC) 72-year-old female with past medical history of CKD 3, hypertension, hyperlipidemia, DM2 and recent diagnosis of colon mass who underwent elective open sigmoidectomy with stoma creation and admitted.  Hospitalist medicine service consulted for medical management.   Existing Precautions/Restrictions abdominal;fall   Cognition   Affect/Mental Status (Cognition) WFL   Orientation Status (Cognition) oriented x 4   Follows Commands (Cognition) WFL   Range of Motion (ROM)   ROM Comment BLE ROM WFL   Strength (Manual Muscle Testing)   Strength Comments BLE strength WFL   Bed Mobility   Bed Mobility supine-sit   Supine-Sit [place] (Bed Mobility) modified independence   Bed Mobility Limitations decreased ability to use arms for pushing/pulling;decreased ability to use legs for bridging/pushing   Impairments Contributing to Impaired Bed Mobility pain   Assistive Device (Bed Mobility) bed rails   Comment, (Bed Mobility) moves slowly 2/2 pain, but able to do on his own   Transfers   Transfers sit-stand transfer   Sit-Stand Transfer   Sit-Stand [place]  (Transfers) supervision   Assistive Device (Sit-Stand Transfers) walker, front-wheeled   Comment, (Sit-Stand Transfer) balance in standing is good   Gait/Stairs (Locomotion)   [place] Level (Gait) contact guard   Assistive Device (Gait) walker, front-wheeled   Distance in Feet 75   Distance in Feet (Gait) 700'   Pattern (Gait) step-through   Comment, (Gait/Stairs) slower pace to begin, mild forward flexed 2/2 pain, no LOB   Balance   Balance no deficits were identified   Clinical Impression   Criteria for Skilled Therapeutic Intervention Yes, treatment indicated   PT Diagnosis (PT) impaired functional mobility   Influenced by the following impairments pain, fatigue   Functional limitations due to impairments gait, stairs   Clinical Presentation (PT Evaluation Complexity) Stable/Uncomplicated   Clinical Presentation Rationale presents as diagnosed   Clinical Decision Making (Complexity) low complexity   Planned Therapy Interventions (PT) gait training;stair training   Anticipated Equipment Needs at Discharge (PT) walker, rolling   Risk & Benefits of therapy have been explained care plan/treatment goals reviewed;patient   PT Total Evaluation Time   PT Eval, Low Complexity Minutes (00764) 10   Plan of Care Review   Plan of Care Reviewed With patient;spouse   Physical Therapy Goals   PT Frequency One time eval and treatment only   PT Predicted Duration/Target Date for Goal Attainment 09/08/23   PT Goals Transfers;Gait;Stairs   PT: Transfers Supervision/stand-by assist;Sit to/from stand;Bed to/from chair;Assistive device   PT: Gait Supervision/stand-by assist;150 feet;Rolling walker   PT: Stairs 10 stairs;Rail on right  (CGA)   Interventions   Interventions Quick Adds Gait Training   Gait Training   Gait Training Minutes (24187) 15   Symptoms Noted During/After Treatment (Gait Training) increased pain   Treatment Detail/Skilled Intervention steady pace with FWW, also trialed without AD - 50', SBA, pain is better  when using the walker.  Added stairs with cues for non-reciprocal pattern, 12 steps total, SBA   [place] Level (Gait Training) stand-by assist  (distant supervision)   Physical Assistance Level (Gait Training) supervision   Weight Bearing (Gait Training) full weight-bearing   Assistive Device (Gait Training) rolling walker   Pattern Analysis (Gait Training) swing-through gait   Stair Railings present on right side   Physical Assist/Nonphysical Assist (Stairs) verbal cues;1 person assist   Level of [place] (Stairs) stand-by assist   PT Discharge Planning   PT Plan pt to continue walking halls with family/staff, no further PT needs at this time, will domplete order   PT Discharge Recommendation (DC Rec) home with assist   PT Rationale for DC Rec assist of wife as needed for ADL's as he continues to recover   PT Brief overview of current status indep bed mobility, transfers/gt/stairs with FWW and SBA, 12 stairs with 1 rail.   Total Session Time   Timed Code Treatment Minutes 15   Total Session Time (sum of timed and untimed services) 25

## 2023-09-08 NOTE — PLAN OF CARE
Physical Therapy Discharge Summary    Reason for therapy discharge:    All goals and outcomes met, no further needs identified.    Progress towards therapy goal(s). See goals on Care Plan in [facility] electronic health record for goal details.  Goals met    Therapy recommendation(s):    No further therapy is recommended.

## 2023-09-08 NOTE — PROGRESS NOTES
Progress note 7-11 pm:   - No significant events.   - PRN tramadol given at bedtime for 3/10 pain along with scheduled acetaminophen.    Gregoria Her RN-BC

## 2023-09-08 NOTE — PROGRESS NOTES
Discharge Nursing Note:    - AVS reviewed with family per the day RN.   - All belongings collected and returned to pt/family.    -- Just before going off unit for discharge, pt reporting large volume stool and leaking from colostomy system. On assessment, the flange was leaking at 9 o'clock location. This area on abdomen has a slight fold crease. Replacement flange and pouch applied. Reinforcement to right side off flange with additional 1/2 stoma ring. Also placed an ostomy belt on. Family present for pouch change, reinforcement of education, and troubleshooting.    Pt and wife comfortable with discharge.  - Discharged home without home care via family transport.    Gregoria Her, RN-BC

## 2023-09-08 NOTE — PROGRESS NOTES
"[facility] Nurse Inpatient Assessment       Summary: Pouch changed early this AM and was demonstrated yesterday. Went step by step through pouch change process with patient and wife. Reviewed teaching (see below) and received permission to send them samples from [facility]. Scheduled patient's clinic appt for the ostomy clinic post-op visit per patient and wife's request.       Consulted for: colostomy    Patient History (according to provider note(s):      Andra Mercado APRN CNP   Nurse Practitioner  Surgery     Progress Notes      Signed     Date of Service: 9/6/2023 11:34 AM  Creation Time: 9/6/2023 11:34 AM       ASSESSMENT:  No diagnosis found.     Alok Mcfarland is a 72 year old male who is s/p open sigmoidectomy with colostomy creation. He is already showing good signs of bowel function this morning with flatus and feculent matter in the ostomy bag. He still feels weak/unsteady and this preceded surgery. Will have PT/OT evaluate him for safety before discharging home.     PLAN:  Await pathology  Diabetic diet  [facility] consult for new colostomy  PT/OT for safety eval  Hopefully home tomorrow if doing well     SUBJECTIVE:   He is feeling well. He has not been out of bed since surgery. He is worrying about getting up and walking as he feels so weak and is unsteady at times. He has been feeling this way for the past week before coming into the hospital. He has not felt it since surgery but attributes this to his inactivity. He denies nausea and vomiting; is tolerating clear liquids. He states that the nurses had to empty his ostomy bag this morning \"before it exploded\".           Assessment:      Assessment of new end Colostomy: Assessment from 9/7/23 [facility] visit. Teaching information updated.  Diagnosis Pertinent to Stoma: Cancer - Colon or Rectum      Surgery Date: 9/5  Surgeon: [facility] [place]  Pouching system in place on assessment today: Cesar bradford " piece and flat   Pouch barrier status: intact and Minimal melting  Pouch last changed/wear time: 1 day  Reason for pouch change today: ostomy education and initial post-op assessment  Effectiveness of current pouching/ supply plan: Attempting new system, will re-evaluate next assessment  Change made with ostomy management today: Yes  Pouching system placed today: Cesar two piece, cut to fit, convex, barrier ring, and skin prep   Supplies: gathered      Last photo: 9/6  Stoma location: LLQ  Stoma size: 38mm ,   Stoma appearance: viable, red, oval, flat, and venous congestion to the perimeter, MD aware  Mucocutaneous junction:  intact, os at 11 oclock and a small dimple to this area  Peristomal complication(s): none   Output: liquid and brown  Output volume emptied during visit: 50 ml  Abdominal assessment: Soft  Surgical site(s): open to air  NG still in place? No  Pain: Dull ache  Is patient still on a PCA? No    Ostomy education assessment:  Participant of teaching session today: patient  and spouse  Education completed today: Pouch change return demonstration, Peristomal skin care, Pouch emptying return demonstration, Fluid and electrolyte balance , Hernia prevention, Lifestyle adjustments , and Discharge instructions  Educational materials/methods: Verbal, Written, Demonstration,  hand out, FOD Andrews education hand out, Product sample, Addressed patient/caregiver questions/concerns, and Left packet of information at bedside   Education still needed: Completed at this time  Learning Comprehension:   Psychosocial assessment: wife at bedside  Patient readiness for education today: lethargic and observing  Following today's visit: patient  and spouse is able to demonstrate;         1. How to empty their pouch? Yes        2. How to change their pouch? Yes and Able to verbalize steps        3. How to read and record intake and output correctly? Manuel provided   Preparation for discharge completed:  "Placed prescription recommendations in discharge navigator for MD to sign, Ensured patient has extra supplies for discharge, Discussed how to order supplies after discharge , Ordered samples from  after gaining consent from patient/caregiver, Discussed how and when to make an outpatient WOC nurse appointment after discharge, Prepared for discharge home with home care, and Discuss signs/symptoms of when to seek medical attention  Preparation for discharge still needed: Completed at this time  Pt support system on discharge: wife  WOC recommend home care? Yes  Face to face time: 20 minutes    Treatment Plan:     LUQ Colostomy pouching plan:   Pouching system: ostomy supplies pouches: Cesar 57 FECAL (679772) ostomy supplies barrier: [place] 57mm SOFT CONVEX (546015)  Accessories used: [facility] ostomy accessories: 2\" Cera Barrier Ring (542191) and Cavilon no sting barrier film (529178)   Frequency of pouch changes: Twice weekly and PRN leakage  WOC follow up plan: Daily Monday-Friday (as able)  Bedside RN interventions: Change pouch PRN if leaking using the supplies above, Empty pouch when 1/3 to 1/2 full, ensure to clean pouch outlet after emptying to prevent odor, Notify WOC for ongoing pouch leakage, Document stoma appearance and output volume, color, and consistency every shift, Encourage patient to empty pouch with assist, and Assist patient to measure and record output      Orders: Reviewed    RECOMMEND PRIMARY TEAM ORDER: None, at this time  Education provided: plan of care  Discussed plan of care with: Patient and Family  WOC nurse follow-up plan: daily  Notify WOC if wound(s) deteriorate.  Nursing to notify the Provider(s) and re-consult the WOC Nurse if new skin concern.    DATA:     Current support surface: Standard  Standard gel/foam mattress (IsoFlex, Atmos air, etc)  Containment of urine/stool: Incontinence Protocol  BMI: Body mass index is 25.1 kg/m .   Active diet order: Orders Placed This " Encounter      Moderate Consistent Carb (60 g CHO per Meal) Diet      Diet     Output: I/O last 3 completed shifts:  In: 843 [P.O.:840; I.V.:3]  Out: 1275 [Urine:1000; Stool:275]     Labs:   Recent Labs   Lab 09/08/23  0525 09/07/23  0615 09/06/23  0608   HGB 9.2*   < > 9.2*   WBC  --   --  10.5    < > = values in this interval not displayed.       Pressure injury risk assessment:   Sensory Perception: 4-->no impairment  Moisture: 3-->occasionally moist  Activity: 3-->walks occasionally  Mobility: 3-->slightly limited  Nutrition: 3-->adequate  Friction and Shear: 3-->no apparent problem  Jeison Score: 19    Vy Gonsalves MSN RN CWOCN  Pager no longer is use, please contact through [employer] group: [facility]

## 2023-09-08 NOTE — PLAN OF CARE
Goal Outcome Evaluation:  Patient ready to be discharged waiting for surgeon to see Patient.

## 2023-09-08 NOTE — PROGRESS NOTES
Occupational Therapy Discharge Summary    Reason for therapy discharge:    All goals and outcomes met, no further needs identified.    Progress towards therapy goal(s). See goals on Care Plan in [facility] electronic health record for goal details.  Goals met    Therapy recommendation(s):    Continued therapy is recommended.  Rationale/Recommendations:  pt may benefit from home OT pending progress with pain management and mobility.       09/08/23 0740   Appointment Info   Signing Clinician's Name / Credentials (OT) Aleena Peterson OTR/L   Living Environment   People in Home spouse   Current Living Arrangements house   Living Environment Comments split level home, tub shower   Self-Care   Equipment Currently Used at Home none  (has equipment from daughter: shower chair, FWW. also may have sock aid and reacher.)   Activity/Exercise/Self-Care Comment ind for BADLs   Instrumental Activities of Daily Living (IADL)   IADL Comments ind for IADLs, retired  but still works part time. spouse retired healthcare worker.   General Information   Onset of Illness/Injury or Date of Surgery 09/05/23   Referring Physician CARISSA, MD Henri   Patient/Family Therapy Goal Statement (OT) learn more about stoma care   Additional Occupational Profile Info/Pertinent History of Current Problem recent dx of colon mass, s/p elective open sigmoidectomy with stoma creation. PMH CKD 3, hypertension, hyperlipidemia, DM2   Existing Precautions/Restrictions abdominal   Cognitive Status Examination   Orientation Status orientation to person, place and time   Affect/Mental Status (Cognitive) WFL;anxious   Follows Commands WFL   Pain Assessment   Patient Currently in Pain Yes, see Vital Sign flowsheet   Range of Motion Comprehensive   General Range of Motion no range of motion deficits identified   Strength Comprehensive (MMT)   General Manual Muscle Testing (MMT) Assessment no strength deficits identified   Bed Mobility   Bed Mobility  supine-sit;sit-supine   Supine-Sit [place] (Bed Mobility) minimum assist (75% patient effort);verbal cues   Sit-Supine [place] (Bed Mobility) minimum assist (75% patient effort);verbal cues   Comment (Bed Mobility) flat bed   Transfers   Transfers sit-stand transfer   Sit-Stand Transfer   Sit-Stand [place] (Transfers) supervision   Balance   Balance Comments mod IND with FWW, SBA without AD   Activities of Daily Living   BADL Assessment/Intervention lower body dressing;bathing   Bathing Assessment/Intervention   [place] Level (Bathing) minimum assist (75% patient effort)   Lower Body Dressing Assessment/Training   [place] Level (Lower Body Dressing) moderate assist (50% patient effort)   Clinical Impression   Criteria for Skilled Therapeutic Interventions Met (OT) Yes, treatment indicated   OT Diagnosis dec BADL indep   OT Problem List-Impairments impacting ADL problems related to;activity tolerance impaired;pain;post-surgical precautions;mobility   Assessment of Occupational Performance 3-5 Performance Deficits   Identified Performance Deficits dressing, bathing, household mobility, functional transfers, all IADLs   Planned Therapy Interventions (OT) ADL retraining;bed mobility training;transfer training;home program guidelines;progressive activity/exercise   Clinical Decision Making Complexity (OT) moderate complexity   Anticipated Equipment Needs Upon Discharge (OT) dressing equipment   Risk & Benefits of therapy have been explained evaluation/treatment results reviewed;participants included;patient   OT Total Evaluation Time   OT Eval, Moderate Complexity Minutes (36111) 10   OT Goals   Therapy Frequency (OT) One time eval and treatment   OT Predicted Duration/Target Date for Goal Attainment 09/08/23   OT Goals Lower Body Dressing;Transfers;OT Goal 1   OT: Lower Body Dressing Modified independent;using adaptive equipment;within precautions   OT: Transfer Modified independent;within  precautions  (tub transfer)   OT: Goal 1 Pt will verbalize and demonstrate understanding of abdominal precautions during household mobility and transfers IND.   Self-Care/Home Management   Self-Care/Home Mgmt/ADL, Compensatory, Meal Prep Minutes (36677) 27   Symptoms Noted During/After Treatment (Meal Preparation/Planning Training) increased pain   Treatment Detail/Skilled Intervention Eval completed and treatment initiated. Educ pt on abdominal precautions and implications for BADL participation. Educ pt on options for figure 4, getting assistance, or AE for LB dressing. Pt unable to achieve figure 4. Educ and demonstrated use of sock aid, reacher, and shoe horn for LB dressing. Following educ, mod IND to don/doff socks and pants with reacher and sock aid. Educ pt on lateral tub transfer technique. Following educ, mod IND for lateral tub transfer. Provided handout for AE recommendations. Extensive cues and problem solving for best bed mobility technique - pt completes with CGA to R side of bed sit <> semi-la.   OT Discharge Planning   OT Plan d/c - defer to PT for mobility   OT Discharge Recommendation (DC Rec) home with assist;home with home care occupational therapy   OT Rationale for DC Rec recomment pt to have PRN assist for BADLs - this is available from spouse. pt may benefit from home OT pending progress with pain while in hospital.   OT Brief overview of current status mod IND for BADLs   Total Session Time   Timed Code Treatment Minutes 27   Total Session Time (sum of timed and untimed services) 37

## 2023-09-11 ENCOUNTER — TELEPHONE (OUTPATIENT)
Dept: WOUND CARE | Facility: HOSPITAL | Age: 73
End: 2023-09-11
Payer: COMMERCIAL

## 2023-09-11 NOTE — DISCHARGE SUMMARY
"Physician Discharge Summary    Primary Care Physician:  Aleena Mahmood    Discharge Provider: Juan F Fall D.O. FACS   Admission Date: 9/5/2023  Discharge Date: 9/8/2023     Primary Diagnosis at Discharge:   Patient Active Problem List   Diagnosis    H/O colectomy      Disposition: Home or Self Care  Condition at Discharge: Stable     Physical Exam:   BP (!) 149/71 (BP Location: Left arm)   Pulse 63   Temp 97.5  F (36.4  C) (Oral)   Resp 18   Ht 1.753 m (5' 9\")   SpO2 98%   BMI 25.10 kg/m     CTA  RRR  Ab Soft  Dressings/wounds are Clean and Dry      Surgery: Open sigmoidectomy      Discharge Medications:      Medication List        Started      acetaminophen 325 MG tablet  Commonly known as: TYLENOL  975 mg, Oral, EVERY 6 HOURS PRN     traMADol 50 MG tablet  Commonly known as: ULTRAM  50 mg, Oral, EVERY 6 HOURS PRN            Modified      amLODIPine 10 MG tablet  Commonly known as: NORVASC  10 mg, Oral, DAILY  Start taking on: September 14, 2023  What changed: These instructions start on September 14, 2023. If you are unsure what to do until then, ask your doctor or other care provider.              Discharge Instructions:  Follow up appointment with Primary Care Physician: Aelena Mahmood  Follow up appointment with Dr. Fall in: 2 weeks  Follow up test / procedure to do as outpatient:   Diet: Regular  Activity: Ad Meagan  Restrictions: No lifting over 20 pounds for total of 6 weeks  Wound / drain care: Abdominal dressings/blue vessel loop maintenance  The following tests have been done with results pending:: Specimen     Hospital Summary:   Alok Mcfarland was admitted for a near obstructing colon mass and underwent an uncomplicated sigmoidectomy.  he recovered on the Med/Surg floor, diet was advanced and his pain was controlled with oral medications.  On POD #2 his Stauffer catheter was removed.  On postoperative day #3 he was doing well and he was discharged home without any complications.          Juan F Fall " MALIKA [facility]  [phone]

## 2023-09-12 ENCOUNTER — TELEPHONE (OUTPATIENT)
Dept: SURGERY | Facility: CLINIC | Age: 73
End: 2023-09-12
Payer: COMMERCIAL

## 2023-09-12 DIAGNOSIS — K63.89 COLONIC MASS: Primary | ICD-10-CM

## 2023-09-12 NOTE — TELEPHONE ENCOUNTER
----- Message from Thor Fall,  sent at 9/11/2023 12:17 PM CDT -----  Can we get him set up with Dr. Chamipon, oncology for possible chemotherapy. Thanks  james

## 2023-09-13 PROBLEM — E83.42 HYPOMAGNESEMIA: Status: ACTIVE | Noted: 2023-09-13

## 2023-09-13 PROBLEM — L40.9 PSORIASIS: Status: ACTIVE | Noted: 2023-09-13

## 2023-09-13 PROBLEM — E66.9 OBESITY: Status: ACTIVE | Noted: 2023-09-13

## 2023-09-13 PROBLEM — F32.1 CURRENT MODERATE EPISODE OF MAJOR DEPRESSIVE DISORDER, UNSPECIFIED WHETHER RECURRENT (H): Status: ACTIVE | Noted: 2023-09-13

## 2023-09-13 PROBLEM — E55.9 VITAMIN D DEFICIENCY: Status: ACTIVE | Noted: 2023-09-13

## 2023-09-13 PROBLEM — E78.2 MIXED HYPERLIPIDEMIA: Status: ACTIVE | Noted: 2023-09-13

## 2023-09-13 PROBLEM — E11.9 TYPE 2 DIABETES MELLITUS WITHOUT COMPLICATION (H): Status: ACTIVE | Noted: 2023-09-13

## 2023-09-13 PROBLEM — E83.51 HYPOCALCEMIA: Status: ACTIVE | Noted: 2023-09-13

## 2023-09-13 PROBLEM — L20.9 ATOPIC DERMATITIS: Status: ACTIVE | Noted: 2023-09-13

## 2023-09-13 PROBLEM — C91.41 HAIRY CELL LEUKEMIA, IN REMISSION (H): Status: ACTIVE | Noted: 2023-09-13

## 2023-09-13 PROBLEM — I10 ESSENTIAL HYPERTENSION: Status: ACTIVE | Noted: 2023-09-13

## 2023-09-13 LAB
PATH REPORT.COMMENTS IMP SPEC: NORMAL
PATH REPORT.COMMENTS IMP SPEC: NORMAL
PATH REPORT.FINAL DX SPEC: NORMAL
PATH REPORT.GROSS SPEC: NORMAL
PATH REPORT.MICROSCOPIC SPEC OTHER STN: NORMAL
PATH REPORT.RELEVANT HX SPEC: NORMAL

## 2023-09-13 PROCEDURE — 88342 IMHCHEM/IMCYTCHM 1ST ANTB: CPT | Mod: 26 | Performed by: PATHOLOGY

## 2023-09-13 PROCEDURE — 88112 CYTOPATH CELL ENHANCE TECH: CPT | Mod: 26 | Performed by: PATHOLOGY

## 2023-09-13 PROCEDURE — 88305 TISSUE EXAM BY PATHOLOGIST: CPT | Mod: 26 | Performed by: PATHOLOGY

## 2023-09-13 PROCEDURE — 88341 IMHCHEM/IMCYTCHM EA ADD ANTB: CPT | Mod: 26 | Performed by: PATHOLOGY

## 2023-09-18 ENCOUNTER — HOSPITAL ENCOUNTER (EMERGENCY)
Facility: HOSPITAL | Age: 73
Discharge: HOME | End: 2023-09-19
Attending: EMERGENCY MEDICINE | Admitting: EMERGENCY MEDICINE
Payer: COMMERCIAL

## 2023-09-18 ENCOUNTER — LAB REQUISITION (OUTPATIENT)
Dept: LAB | Facility: CLINIC | Age: 73
End: 2023-09-18

## 2023-09-18 DIAGNOSIS — E87.6 HYPOKALEMIA: ICD-10-CM

## 2023-09-18 DIAGNOSIS — Z93.3 S/P COLOSTOMY (H): ICD-10-CM

## 2023-09-18 DIAGNOSIS — E83.42 HYPOMAGNESEMIA: ICD-10-CM

## 2023-09-18 LAB
ALBUMIN SERPL BCG-MCNC: 3.4 G/DL (ref 3.5–5.2)
ALP SERPL-CCNC: 75 U/L (ref 40–129)
ALT SERPL W P-5'-P-CCNC: 16 U/L (ref 0–70)
ANION GAP SERPL CALCULATED.3IONS-SCNC: 10 MMOL/L (ref 7–15)
ANION GAP SERPL CALCULATED.3IONS-SCNC: 11 MMOL/L (ref 7–15)
AST SERPL W P-5'-P-CCNC: 26 U/L (ref 0–45)
BASOPHILS # BLD AUTO: 0 10E3/UL (ref 0–0.2)
BASOPHILS NFR BLD AUTO: 1 %
BILIRUB SERPL-MCNC: 0.3 MG/DL
BUN SERPL-MCNC: 14 MG/DL (ref 8–23)
BUN SERPL-MCNC: 16.6 MG/DL (ref 8–23)
CALCIUM SERPL-MCNC: 8.3 MG/DL (ref 8.8–10.2)
CALCIUM SERPL-MCNC: 8.3 MG/DL (ref 8.8–10.2)
CHLORIDE SERPL-SCNC: 103 MMOL/L (ref 98–107)
CHLORIDE SERPL-SCNC: 104 MMOL/L (ref 98–107)
CREAT SERPL-MCNC: 1.12 MG/DL (ref 0.67–1.17)
CREAT SERPL-MCNC: 1.14 MG/DL (ref 0.67–1.17)
DEPRECATED HCO3 PLAS-SCNC: 24 MMOL/L (ref 22–29)
DEPRECATED HCO3 PLAS-SCNC: 25 MMOL/L (ref 22–29)
EGFRCR SERPLBLD CKD-EPI 2021: 69 ML/MIN/1.73M2
EGFRCR SERPLBLD CKD-EPI 2021: 70 ML/MIN/1.73M2
EOSINOPHIL # BLD AUTO: 0 10E3/UL (ref 0–0.7)
EOSINOPHIL NFR BLD AUTO: 1 %
ERYTHROCYTE [DISTWIDTH] IN BLOOD BY AUTOMATED COUNT: 22.5 % (ref 10–15)
GLUCOSE SERPL-MCNC: 118 MG/DL (ref 70–99)
GLUCOSE SERPL-MCNC: 190 MG/DL (ref 70–99)
HCT VFR BLD AUTO: 29.9 % (ref 40–53)
HGB BLD-MCNC: 9.3 G/DL (ref 13.3–17.7)
IMM GRANULOCYTES # BLD: 0 10E3/UL
IMM GRANULOCYTES NFR BLD: 1 %
LYMPHOCYTES # BLD AUTO: 1 10E3/UL (ref 0.8–5.3)
LYMPHOCYTES NFR BLD AUTO: 22 %
MAGNESIUM SERPL-MCNC: 1.4 MG/DL (ref 1.7–2.3)
MCH RBC QN AUTO: 21 PG (ref 26.5–33)
MCHC RBC AUTO-ENTMCNC: 31.1 G/DL (ref 31.5–36.5)
MCV RBC AUTO: 68 FL (ref 78–100)
MONOCYTES # BLD AUTO: 0.3 10E3/UL (ref 0–1.3)
MONOCYTES NFR BLD AUTO: 6 %
NEUTROPHILS # BLD AUTO: 3 10E3/UL (ref 1.6–8.3)
NEUTROPHILS NFR BLD AUTO: 69 %
NRBC # BLD AUTO: 0 10E3/UL
NRBC BLD AUTO-RTO: 0 /100
PLATELET # BLD AUTO: 211 10E3/UL (ref 150–450)
POTASSIUM SERPL-SCNC: 2.4 MMOL/L (ref 3.4–5.3)
POTASSIUM SERPL-SCNC: 2.7 MMOL/L (ref 3.4–5.3)
PROT SERPL-MCNC: 6.2 G/DL (ref 6.4–8.3)
RBC # BLD AUTO: 4.43 10E6/UL (ref 4.4–5.9)
SODIUM SERPL-SCNC: 138 MMOL/L (ref 136–145)
SODIUM SERPL-SCNC: 139 MMOL/L (ref 136–145)
T4 FREE SERPL-MCNC: 1.14 NG/DL (ref 0.9–1.7)
TSH SERPL DL<=0.005 MIU/L-ACNC: 4.69 UIU/ML (ref 0.3–4.2)
WBC # BLD AUTO: 4.3 10E3/UL (ref 4–11)

## 2023-09-18 PROCEDURE — 80053 COMPREHEN METABOLIC PANEL: CPT | Performed by: EMERGENCY MEDICINE

## 2023-09-18 PROCEDURE — 250N000013 HC RX MED GY IP 250 OP 250 PS 637: Performed by: EMERGENCY MEDICINE

## 2023-09-18 PROCEDURE — 82310 ASSAY OF CALCIUM: CPT | Performed by: FAMILY MEDICINE

## 2023-09-18 PROCEDURE — 84443 ASSAY THYROID STIM HORMONE: CPT | Performed by: EMERGENCY MEDICINE

## 2023-09-18 PROCEDURE — 85004 AUTOMATED DIFF WBC COUNT: CPT | Performed by: STUDENT IN AN ORGANIZED HEALTH CARE EDUCATION/TRAINING PROGRAM

## 2023-09-18 PROCEDURE — 36415 COLL VENOUS BLD VENIPUNCTURE: CPT | Performed by: STUDENT IN AN ORGANIZED HEALTH CARE EDUCATION/TRAINING PROGRAM

## 2023-09-18 PROCEDURE — 85025 COMPLETE CBC W/AUTO DIFF WBC: CPT | Performed by: EMERGENCY MEDICINE

## 2023-09-18 PROCEDURE — 96365 THER/PROPH/DIAG IV INF INIT: CPT

## 2023-09-18 PROCEDURE — 84439 ASSAY OF FREE THYROXINE: CPT | Performed by: EMERGENCY MEDICINE

## 2023-09-18 PROCEDURE — 83735 ASSAY OF MAGNESIUM: CPT | Performed by: EMERGENCY MEDICINE

## 2023-09-18 PROCEDURE — 250N000011 HC RX IP 250 OP 636: Mod: JZ | Performed by: EMERGENCY MEDICINE

## 2023-09-18 PROCEDURE — 96367 TX/PROPH/DG ADDL SEQ IV INF: CPT

## 2023-09-18 PROCEDURE — 99284 EMERGENCY DEPT VISIT MOD MDM: CPT | Mod: 25

## 2023-09-18 PROCEDURE — 93005 ELECTROCARDIOGRAM TRACING: CPT | Performed by: STUDENT IN AN ORGANIZED HEALTH CARE EDUCATION/TRAINING PROGRAM

## 2023-09-18 PROCEDURE — 93005 ELECTROCARDIOGRAM TRACING: CPT | Performed by: EMERGENCY MEDICINE

## 2023-09-18 RX ORDER — POTASSIUM CHLORIDE 7.45 MG/ML
10 INJECTION INTRAVENOUS ONCE
Status: COMPLETED | OUTPATIENT
Start: 2023-09-18 | End: 2023-09-19

## 2023-09-18 RX ORDER — MAGNESIUM OXIDE 400 MG/1
400 TABLET ORAL 2 TIMES DAILY
Qty: 30 TABLET | Refills: 0 | Status: SHIPPED | OUTPATIENT
Start: 2023-09-18

## 2023-09-18 RX ORDER — MAGNESIUM SULFATE HEPTAHYDRATE 40 MG/ML
2 INJECTION, SOLUTION INTRAVENOUS ONCE
Status: COMPLETED | OUTPATIENT
Start: 2023-09-18 | End: 2023-09-18

## 2023-09-18 RX ORDER — POTASSIUM CHLORIDE 1.5 G/1.58G
40 POWDER, FOR SOLUTION ORAL ONCE
Status: COMPLETED | OUTPATIENT
Start: 2023-09-18 | End: 2023-09-18

## 2023-09-18 RX ADMIN — MAGNESIUM SULFATE HEPTAHYDRATE 2 G: 40 INJECTION, SOLUTION INTRAVENOUS at 22:47

## 2023-09-18 RX ADMIN — POTASSIUM CHLORIDE 40 MEQ: 1.5 POWDER, FOR SOLUTION ORAL at 22:47

## 2023-09-18 RX ADMIN — POTASSIUM CHLORIDE 10 MEQ: 7.46 INJECTION, SOLUTION INTRAVENOUS at 23:25

## 2023-09-18 ASSESSMENT — ACTIVITIES OF DAILY LIVING (ADL): ADLS_ACUITY_SCORE: 33

## 2023-09-19 VITALS
HEART RATE: 71 BPM | HEIGHT: 69 IN | BODY MASS INDEX: 24.6 KG/M2 | RESPIRATION RATE: 16 BRPM | DIASTOLIC BLOOD PRESSURE: 71 MMHG | WEIGHT: 166.1 LBS | SYSTOLIC BLOOD PRESSURE: 140 MMHG | OXYGEN SATURATION: 97 % | TEMPERATURE: 97 F

## 2023-09-19 NOTE — ED TRIAGE NOTES
Patient was being treated for low K with Entira and went of recheck today and he got a call that it was 2.4 and to go to the ER. No complaints of patients  Patient got a colostomy on 9/5   Assessment of colostomy is that there is very thin liquid stool contents      Triage Assessment       Row Name 09/18/23 2110       Triage Assessment (Adult)    Airway WDL WDL       Respiratory WDL    Respiratory WDL WDL       Skin Circulation/Temperature WDL    Skin Circulation/Temperature WDL WDL       Cardiac WDL    Cardiac WDL WDL       Peripheral/Neurovascular WDL    Peripheral Neurovascular WDL WDL       Cognitive/Neuro/Behavioral WDL    Cognitive/Neuro/Behavioral WDL WDL

## 2023-09-19 NOTE — ED NOTES
Pt came to ED on advice from clinic due to low potassium level. Patient denies any chest pain, palpitations, SOB and states that he has been taking potassium supplements. Patient has new colostomy and is able to empty the bag independently. Patient is ambulatory.

## 2023-09-19 NOTE — DISCHARGE INSTRUCTIONS
Double your potassium dose for the next 3 days, then go back to the previous dose.    Take the magnesium supplement as prescribed as well.    Continue all of your other medications.    Drink plenty of fluids to stay hydrated.    Follow up with your Primary Care provider in 2 days for a recheck.    Return to the Emergency Department for any persistent vomiting, new or worsening symptoms, or any other concerns.

## 2023-09-19 NOTE — ED PROVIDER NOTES
EMERGENCY DEPARTMENT ENCOUNTER      NAME: Alok Mcfarland  AGE: 72 year old male  YOB: 1950  MRN: [id number]  EVALUATION DATE & TIME: 9/18/2023  9:32 PM    PCP: Aleena Mahmood    ED PROVIDER: Milton Cantor M.D.      Chief Complaint   Patient presents with    Abnormal Labs         IMPRESSION  1. Hypokalemia    2. Hypomagnesemia    3. S/P colostomy (H)        PLAN  - MagOx 400mg BID  - continue previously-prescribed K supplementation (40mEq BID x3d then back to 20mEq BID)  - close PCP follow up  - discharge to home    ED COURSE & MEDICAL DECISION MAKING    ED Course as of 09/18/23 2253   Mon Sep 18, 2023   2247 72yoM with history of sigmoidectomy 3 weeks ago (obstructing colonic mass) with resulting colostomy who had routine blood draw today; found to have K 2.4 so told to come to the ED. Patient states he has been feeling fine recently. No vomiting or abnormal ostomy output. Has been eating & drinking. Takes 20mEq K BID already.    Vitals unremarkable on presentation. Exam unremarkable as well with moist mucous membranes, clear lungs, normal work of breathing, no abdominal tenderness with unremarkable ostomy site, trace nontender pitting edema to lower shins bilaterally (ongoing since surgery).    EKG with normal QTc, no arrhythmia or ischemic changes; no changes of low lytes. Labs with K 2.7, mag 1.4 (both replaced), no OLIMPIA, stable hemoglobin at 9.3, no leukocytosis.    Patient has mildly low electrolytes with no EKG changes or vomiting; ok for outpatient management. Will add Mg supplementation to K supplementation. Patient able to tolerate PO and walk without difficulty. Patient & family comfortable with discharge home at this time. Return precautions and need for PCP follow up discussed and understood. No further questions at the time of discharge.       --------------------------------------------------------------------------------    --------------------------------------------------------------------------------     10:31 PM I met with the patient for the initial interview and physical examination. Discussed plan for treatment and workup in the ED.  10:41 PM We discussed the plan for discharge and the patient is agreeable. Reviewed supportive cares, symptomatic treatment, outpatient follow up, and reasons to return to the Emergency Department. Patient to be discharged by ED RN.       This patient involved a high degree of complexity in medical decision making, as significant risks were present and assessed. Recent encounters & results in medical record reviewed by me.    All workup (i.e. any EKG/labs/imaging as per charting below) reviewed and independently interpreted by me. See respective sections for details.    Broad differential considered for this patient, including but not limited to:  dehydration, electrolyte derangement, arrhythmia, OLIMPIA, hypothyroidism, SBO      See additional MDM below if interested.      MEDICATIONS GIVEN IN THE EMERGENCY DEPARTMENT  Medications   potassium chloride 10 mEq in 100 mL sterile water infusion (has no administration in time range)   magnesium sulfate 2 g in 50 mL sterile water intermittent infusion (2 g Intravenous $New Bag 9/18/23 2247)   potassium chloride (KLOR-CON) Packet 40 mEq (40 mEq Oral $Given 9/18/23 2247)       NEW PRESCRIPTIONS STARTED AT TODAY'S ER VISIT  Current Discharge Medication List        START taking these medications    Details   magnesium oxide (MAG-OX) 400 MG tablet Take 1 tablet (400 mg) by mouth 2 times daily  Qty: 30 tablet, Refills: 0           CONTINUE these medications which have NOT CHANGED    Details   acetaminophen (TYLENOL) 325 MG tablet Take 3 tablets (975 mg) by mouth every 6 hours as needed for mild pain    Associated Diagnoses: S/P partial colectomy      amLODIPine (NORVASC) 10 MG tablet Take 1 tablet (10 mg) by mouth daily    Associated Diagnoses: Essential  hypertension, benign      atenolol (TENORMIN) 50 MG tablet Take 50 mg by mouth daily      lisinopril (ZESTRIL) 30 MG tablet Take 30 mg by mouth daily      metFORMIN (GLUCOPHAGE) 500 MG tablet Take 1,000 mg by mouth daily (with breakfast)      ondansetron (ZOFRAN) 8 MG tablet Take 8 mg by mouth 3 times daily as needed for nausea      pravastatin (PRAVACHOL) 20 MG tablet TAKE ONE TABLET BY MOUTH EVERY NIGHT AT BEDTIME*      traMADol (ULTRAM) 50 MG tablet Take 1 tablet (50 mg) by mouth every 6 hours as needed for moderate pain  Qty: 8 tablet, Refills: 0    Associated Diagnoses: S/P partial colectomy                 =================================================================      HPI  Use of : N/A         Alok MARIN Mcfarland is a 72 year old male with a pertinent history of hair cell leukemia in remission, colostomy (9/5/23), HTN, DM2, HLD, who presents to this ED via walk-in with family for evaluation of abnormal labs.    Per chart review, patient was seen at [facility] for blood draw. Labs showed Potassium was 2.4. Patient was advised to go to the ED for further evaluation.    Patient reports he was going to his routine blood draw this morning and received a call later at 7:30 PM regarding abnormal labs results. They told him he had a potassium level of 2.4 and advised him to go to the ED for further evaluation. Patient otherwise denies associating changes in appetite or liquid intake. Patient's ostomy is still producing semi-liquid out put. Of note, he started taking supplemental potassium pills 20 juan pablo BID on Friday (9/15/23). There were no other concerns/complaints at this time.        --------------- MEDICAL HISTORY ---------------  PAST MEDICAL HISTORY:  Reviewed independently by me.  Past Medical History:   Diagnosis Date    Diabetes (H)     Hypertension      Patient Active Problem List   Diagnosis    H/O colectomy    Vitamin D deficiency    Obesity    Mixed hyperlipidemia    Hypomagnesemia     Psoriasis    Type 2 diabetes mellitus without complication (H)    Hypocalcemia    Hairy cell leukemia, in remission (H)    Essential hypertension    Current moderate episode of major depressive disorder, unspecified whether recurrent (H)    Atopic dermatitis       PAST SURGICAL HISTORY:  Reviewed independently by me.  Past Surgical History:   Procedure Laterality Date    IR MISCELLANEOUS PROCEDURE  10/18/2000    LAPAROSCOPIC ASSISTED COLECTOMY N/A 9/5/2023    Procedure: COLECTOMY, PARTIAL LAPAROSCOPIC, LAPAROSCOPIC LYSIS OF ADHESIONS;  Surgeon: Thor Fall DO;  Location: [facility] OR    RECTAL PROLAPSE REPAIR N/A 9/5/2023    Procedure: open sigmoidectomy,;  Surgeon: Thor Fall DO;  Location: [facility] OR    RESECT SMALL BOWEL WITH OSTOMY N/A 9/5/2023    Procedure: ostomy creation;  Surgeon: Thor Fall DO;  Location: [facility] OR       CURRENT MEDICATIONS:    Reviewed independently by me.    Current Facility-Administered Medications:     magnesium sulfate 2 g in 50 mL sterile water intermittent infusion, 2 g, Intravenous, Once, Milton Cantor MD, Last Rate: 100 mL/hr at 09/18/23 2247, 2 g at 09/18/23 2247    potassium chloride 10 mEq in 100 mL sterile water infusion, 10 mEq, Intravenous, Once, Milton Cantor MD    Current Outpatient Medications:     magnesium oxide (MAG-OX) 400 MG tablet, Take 1 tablet (400 mg) by mouth 2 times daily, Disp: 30 tablet, Rfl: 0    acetaminophen (TYLENOL) 325 MG tablet, Take 3 tablets (975 mg) by mouth every 6 hours as needed for mild pain, Disp: , Rfl:     amLODIPine (NORVASC) 10 MG tablet, Take 1 tablet (10 mg) by mouth daily, Disp: , Rfl:     atenolol (TENORMIN) 50 MG tablet, Take 50 mg by mouth daily, Disp: , Rfl:     lisinopril (ZESTRIL) 30 MG tablet, Take 30 mg by mouth daily, Disp: , Rfl:     metFORMIN (GLUCOPHAGE) 500 MG tablet, Take 1,000 mg by mouth daily (with breakfast), Disp: , Rfl:     ondansetron (ZOFRAN) 8 MG tablet,  "Take 8 mg by mouth 3 times daily as needed for nausea, Disp: , Rfl:     pravastatin (PRAVACHOL) 20 MG tablet, TAKE ONE TABLET BY MOUTH EVERY NIGHT AT BEDTIME*, Disp: , Rfl:     traMADol (ULTRAM) 50 MG tablet, Take 1 tablet (50 mg) by mouth every 6 hours as needed for moderate pain, Disp: 8 tablet, Rfl: 0    ALLERGIES:  Reviewed independently by me.  No Known Allergies    FAMILY HISTORY:  Reviewed independently by me.  History reviewed. No pertinent family history.    SOCIAL HISTORY:   Reviewed independently by me.  Social History     Socioeconomic History    Marital status:    Tobacco Use    Smoking status: Never    Smokeless tobacco: Never   Substance and Sexual Activity    Alcohol use: Never    Drug use: Never       --------------- PHYSICAL EXAM ---------------  Nursing notes and vitals independently reviewed by me.  VITALS:  Vitals:    09/18/23 2110   BP: (!) 150/70   Pulse: 99   Resp: 16   Temp: 97  F (36.1  C)   TempSrc: Temporal   SpO2: 97%   Weight: 75.3 kg (166 lb 1.6 oz)   Height: 1.753 m (5' 9\")       PHYSICAL EXAM:    General:  alert, interactive, no distress  Eyes:  conjunctivae clear, conjugate gaze  HENT:  atraumatic, nose with no rhinorrhea, oropharynx clear  Neck:  no meningismus  Cardiovascular:  HR 70's during exam, regular rhythm, no murmurs, brisk cap refill  Chest:  no chest wall tenderness  Pulmonary:  no stridor, normal phonation, normal work of breathing, clear lungs bilaterally  Abdomen:  soft, nondistended, nontender. Unremarkable LLQ ostomy site.  :  no CVA tenderness  Back:  no midline spinal tenderness  Musculoskeletal: no calf tenderness. Trace non tender pitting edema to lower shins bilaterally. Gross ROM intact to joints of extremities with no obvious deformities.  Skin:  warm, dry, no rash  Neuro:  awake, alert, answers questions appropriately, follows commands, moves all limbs  Psych:  calm, normal affect      --------------- RESULTS ---------------  EKG:    Reviewed and " independently interpreted by me.  - NSR at 82bpm, no ST changes, , QRS 92, QTc 453  - no priors for comparison; no changes of hypoK or hypoMg  My read.    LAB:  Reviewed and independently interpreted by me.  Results for orders placed or performed during the hospital encounter of 09/18/23   Comprehensive metabolic panel   Result Value Ref Range    Sodium 138 136 - 145 mmol/L    Potassium 2.7 (L) 3.4 - 5.3 mmol/L    Chloride 103 98 - 107 mmol/L    Carbon Dioxide (CO2) 25 22 - 29 mmol/L    Anion Gap 10 7 - 15 mmol/L    Urea Nitrogen 16.6 8.0 - 23.0 mg/dL    Creatinine 1.14 0.67 - 1.17 mg/dL    Calcium 8.3 (L) 8.8 - 10.2 mg/dL    Glucose 190 (H) 70 - 99 mg/dL    Alkaline Phosphatase 75 40 - 129 U/L    AST 26 0 - 45 U/L    ALT 16 0 - 70 U/L    Protein Total 6.2 (L) 6.4 - 8.3 g/dL    Albumin 3.4 (L) 3.5 - 5.2 g/dL    Bilirubin Total 0.3 <=1.2 mg/dL    GFR Estimate 69 >60 mL/min/1.73m2   CBC with platelets and differential   Result Value Ref Range    WBC Count 4.3 4.0 - 11.0 10e3/uL    RBC Count 4.43 4.40 - 5.90 10e6/uL    Hemoglobin 9.3 (L) 13.3 - 17.7 g/dL    Hematocrit 29.9 (L) 40.0 - 53.0 %    MCV 68 (L) 78 - 100 fL    MCH 21.0 (L) 26.5 - 33.0 pg    MCHC 31.1 (L) 31.5 - 36.5 g/dL    RDW 22.5 (H) 10.0 - 15.0 %    Platelet Count 211 150 - 450 10e3/uL    % Neutrophils 69 %    % Lymphocytes 22 %    % Monocytes 6 %    % Eosinophils 1 %    % Basophils 1 %    % Immature Granulocytes 1 %    NRBCs per 100 WBC 0 <1 /100    Absolute Neutrophils 3.0 1.6 - 8.3 10e3/uL    Absolute Lymphocytes 1.0 0.8 - 5.3 10e3/uL    Absolute Monocytes 0.3 0.0 - 1.3 10e3/uL    Absolute Eosinophils 0.0 0.0 - 0.7 10e3/uL    Absolute Basophils 0.0 0.0 - 0.2 10e3/uL    Absolute Immature Granulocytes 0.0 <=0.4 10e3/uL    Absolute NRBCs 0.0 10e3/uL   Result Value Ref Range    Magnesium 1.4 (L) 1.7 - 2.3 mg/dL   TSH with free T4 reflex   Result Value Ref Range    TSH 4.69 (H) 0.30 - 4.20 uIU/mL                 --------------- ADDITIONAL MDM  ---------------  History:  - I considered systemic symptoms of the presenting illness.  - Supplemental history from:       -- see above charting, if applicable: patient, family (wife, daughter)  - External Record(s) reviewed:       -- see above charting, if applicable       -- Inpatient/outpatient record (admission 9/5/23), prior labs (blood 9/8/23), prior imaging (CT 8/29/23)    Workup:  - Chart documentation above includes differential considered and any EKGs or imaging independently interpreted by provider.  - In additional to work up documented, I considered the following work up:       -- see above charting, if applicable    External Consultation:  - Discussion of management with another provider:       -- see above charting, if applicable    Complicating Factors:  - Care impacted by chronic illness:       -- see above MDM, past medical history, & problem list  - Care affected by social determinants of health:       -- see above social history       -- Access to Affordable Healthcare    Disposition Considerations:  - Discharge       -- I considered escalation of care with admission to the hospital, but ultimately discharged the patient per decision making above       -- I recommended the patient continue their current prescription strength medication(s) as charted above in current medications list       -- I prescribed prescription strength medication(s) as charted above       -- I recommended over-the-counter medication(s) as charted above & in discharge instructions         I, aKyla Velazquez, am serving as a scribe to document services personally performed by Dr. Milton Cantor based on my observation and the provider's statements to me. I, Milton Cantor MD attest that Kayla Velazquez is acting in a scribe capacity, has observed my performance of the services and has documented them in accordance with my direction.      Milton Cantor MD  09/18/23  Emergency Medicine  [facility] EMERGENCY  DEPARTMENT  [address] [place] [id number]  [phone]  Dept: [phone]     Milton Cantor MD  09/18/23 3884

## 2023-09-21 ENCOUNTER — OFFICE VISIT (OUTPATIENT)
Dept: SURGERY | Facility: CLINIC | Age: 73
End: 2023-09-21
Payer: COMMERCIAL

## 2023-09-21 DIAGNOSIS — K63.89 COLONIC MASS: Primary | ICD-10-CM

## 2023-09-21 PROCEDURE — 99024 POSTOP FOLLOW-UP VISIT: CPT | Performed by: SURGERY

## 2023-09-21 NOTE — LETTER
9/21/2023         RE: Alok Mcfarland  [address] [facility] [address]        Dear Colleague,    Thank you for referring your patient, Alok Mcfarland, to the [facility] [place]. Please see a copy of my visit note below.     HPI: Alok Mcfarland is here for follow up for sigmoidectomy and diverting colostomy.  He is doing well with no complaints.    Allergies, Medications, Social History, Past Medical History and Past Surgical History were reviewed and are noted in the chart.    There were no vitals taken for this visit.  There is no height or weight on file to calculate BMI.      EXAM:   GENERAL: Appears well  Midline incision intact.  Vessel loop removed.  Stoma pink and patent    Colostomy (Active)       Incision/Surgical Site 09/05/23 Abdomen (Active)       Assessment/Plan: Alok Mcfarland continues to do well. His wound is healing and overall progressing well.  At this point he has oncology follow-up on October 3.  Once there is a plan in place for chemotherapy or observation we will reconvene to discuss next steps forward.    Thor Fall DO [facility] Department of Surgery      Again, thank you for allowing me to participate in the care of your patient.        Sincerely,        Thor Fall DO

## 2023-09-21 NOTE — PROGRESS NOTES
HPI: Alok Mcfarland is here for follow up for sigmoidectomy and diverting colostomy.  He is doing well with no complaints.    Allergies, Medications, Social History, Past Medical History and Past Surgical History were reviewed and are noted in the chart.    There were no vitals taken for this visit.  There is no height or weight on file to calculate BMI.      EXAM:   GENERAL: Appears well  Midline incision intact.  Vessel loop removed.  Stoma pink and patent    Colostomy (Active)       Incision/Surgical Site 09/05/23 Abdomen (Active)       Assessment/Plan: Alok Mcfarland continues to do well. His wound is healing and overall progressing well.  At this point he has oncology follow-up on October 3.  Once there is a plan in place for chemotherapy or observation we will reconvene to discuss next steps forward.    Thor Fall DO [facility] Department of Surgery

## 2023-09-22 LAB
ATRIAL RATE - MUSE: 82 BPM
DIASTOLIC BLOOD PRESSURE - MUSE: NORMAL MMHG
INTERPRETATION ECG - MUSE: NORMAL
P AXIS - MUSE: 19 DEGREES
PR INTERVAL - MUSE: 210 MS
QRS DURATION - MUSE: 92 MS
QT - MUSE: 388 MS
QTC - MUSE: 453 MS
R AXIS - MUSE: 19 DEGREES
SYSTOLIC BLOOD PRESSURE - MUSE: NORMAL MMHG
T AXIS - MUSE: -33 DEGREES
VENTRICULAR RATE- MUSE: 82 BPM

## 2023-09-28 ENCOUNTER — HOSPITAL ENCOUNTER (OUTPATIENT)
Dept: WOUND CARE | Facility: HOSPITAL | Age: 73
Discharge: HOME | End: 2023-09-28
Admitting: SURGERY
Payer: COMMERCIAL

## 2023-09-28 DIAGNOSIS — Z43.3 ATTENTION TO COLOSTOMY (H): Primary | ICD-10-CM

## 2023-09-28 PROCEDURE — G0463 HOSPITAL OUTPT CLINIC VISIT: HCPCS

## 2023-09-28 NOTE — PROGRESS NOTES
[facility]  OUTPATIENT OSTOMY ASSESSMENT    INTAKE  Type of Stoma: Temporary Colostomy  Anticipated date of takedown: 3-4 months    Diagnosis Pertinent to Stoma: Diverticulitis with perforation   Type of Surgery:  Resection with colostomy placement  Surgery Date: 9/5/23  Surgeon:Eufemia       Hospital: [facility]    Purpose of this visit: Checkup:4 week post-op    Pertinent Information: Normal wear time 4-5 days but some instances of leaking    Present for Teaching Session: Patient and Spouse   Present: MARCO      Current Equipment:   Flat cut to fit flange with 2 piece pouch and [employer] Cera ring.    Pouch Change Frequency: 4-5 days  Provider of Care: Emptying: Self Pouch Change: Self/wife    ASSESSMENT    Did have some blistering earlier this week - see below  Friday Sunday    Stoma Size: Oval 3/4 x 1 1/4 inches  Protrusion:  0.5 cm  Stoma Appearance: Pink and Moist  Mucocutaneous Juncture: Intact   Peristomal Skin: Erythema  Abdominal Assessment:  WDL - surgical sites closed    TREATMENT  Applied No Sting Skin barrier    INTERVENTIONS  Refitting done, Educated on peristomal skin treatment, and Educated on pouch change procedure    INSTRUCTIONS GIVEN  [facility] Role, [place] sliding scale program, Insurance, and Ordering supplies    PLAN  Change in Supplies: See Outpatient Ostomy Instructions and New Pouching Procedure: See Outpatient Ostomy Instructions      Total Time Spent with Patient: 45 minutes

## 2023-09-28 NOTE — DISCHARGE INSTRUCTIONS
"OUTPATIENT OSTOMY INSTRUCTIONS                                                                        Type of Stoma: Colostomy         Supplier: [employer] [phone], [employer] [phone], [facility] [phone], and your choice      Equipment:    Product # Brand Description   Pouch [id number] [place] 1 pc 1 1/4\" soft convex   Paste 8805 Cesar [employer] Cera ring   Powder 7906 Cesar [employer]   Liquid Skin Barrier 3344 3M [place] No Sting                                Procedure:  Close the bottom of the pouch.  Remove backings from adhesive surfaces.  Remove the soiled pouch and discard.  Wash skin with water and dry.    Then:  Apply No-Sting to irritated skin.  Then: Apply Ring/Paste: Around stoma.               Then: Apply pouching system to stoma site and hold in place for 2-5 minutes.     ______________________________________________________________________________    Pouch Change:  Every 4 days       [facility] Nurse Specialist: Vy Gonsalves RN CWOCN  Questions: [facility] [phone] ()      Follow-up Appointment: As needed. Please call [facility] [phone] () to request an appointment.     "

## 2023-09-29 ENCOUNTER — MEDICAL CORRESPONDENCE (OUTPATIENT)
Dept: HEALTH INFORMATION MANAGEMENT | Facility: CLINIC | Age: 73
End: 2023-09-29
Payer: COMMERCIAL

## 2023-09-29 ENCOUNTER — TELEPHONE (OUTPATIENT)
Dept: WOUND CARE | Facility: HOSPITAL | Age: 73
End: 2023-09-29
Payer: COMMERCIAL

## 2023-10-03 ENCOUNTER — PRE VISIT (OUTPATIENT)
Dept: ONCOLOGY | Facility: HOSPITAL | Age: 73
End: 2023-10-03
Payer: COMMERCIAL

## 2023-10-03 ENCOUNTER — ONCOLOGY VISIT (OUTPATIENT)
Dept: ONCOLOGY | Facility: HOSPITAL | Age: 73
End: 2023-10-03
Attending: INTERNAL MEDICINE
Payer: COMMERCIAL

## 2023-10-03 VITALS
DIASTOLIC BLOOD PRESSURE: 62 MMHG | WEIGHT: 175 LBS | BODY MASS INDEX: 26.52 KG/M2 | OXYGEN SATURATION: 97 % | SYSTOLIC BLOOD PRESSURE: 135 MMHG | RESPIRATION RATE: 20 BRPM | HEART RATE: 77 BPM | HEIGHT: 68 IN | TEMPERATURE: 97.9 F

## 2023-10-03 DIAGNOSIS — K63.89 COLONIC MASS: ICD-10-CM

## 2023-10-03 DIAGNOSIS — C18.7 CANCER OF SIGMOID COLON (H): Primary | ICD-10-CM

## 2023-10-03 PROCEDURE — 99204 OFFICE O/P NEW MOD 45 MIN: CPT | Performed by: INTERNAL MEDICINE

## 2023-10-03 PROCEDURE — G0463 HOSPITAL OUTPT CLINIC VISIT: HCPCS | Performed by: INTERNAL MEDICINE

## 2023-10-03 RX ORDER — BLOOD SUGAR DIAGNOSTIC
STRIP MISCELLANEOUS
COMMUNITY
Start: 2023-09-20

## 2023-10-03 RX ORDER — BLOOD-GLUCOSE METER
EACH MISCELLANEOUS
COMMUNITY
Start: 2023-09-12

## 2023-10-03 RX ORDER — OMEPRAZOLE 10 MG/1
20 CAPSULE, DELAYED RELEASE ORAL DAILY
COMMUNITY

## 2023-10-03 RX ORDER — POTASSIUM CHLORIDE 1500 MG/1
TABLET, EXTENDED RELEASE ORAL
COMMUNITY
Start: 2023-09-15

## 2023-10-03 RX ORDER — ACETAMINOPHEN 500 MG
500 TABLET ORAL EVERY 6 HOURS PRN
COMMUNITY

## 2023-10-03 ASSESSMENT — PAIN SCALES - GENERAL: PAINLEVEL: MILD PAIN (3)

## 2023-10-03 NOTE — PROGRESS NOTES
"Oncology Rooming Note    October 3, 2023 3:00 PM   Alok Mcfarland is a 72 year old male who presents for:    Chief Complaint   Patient presents with    Oncology Clinic Visit     New Patient Consult - Colonic mass     Initial Vitals: /62 (BP Location: Left arm, Patient Position: Sitting, Cuff Size: Adult Regular)   Pulse 77   Temp 97.9  F (36.6  C) (Oral)   Resp 20   Ht 1.715 m (5' 7.5\")   Wt 79.4 kg (175 lb)   SpO2 97%   BMI 27.00 kg/m   Estimated body mass index is 27 kg/m  as calculated from the following:    Height as of this encounter: 1.715 m (5' 7.5\").    Weight as of this encounter: 79.4 kg (175 lb). Body surface area is 1.94 meters squared.  Mild Pain (3) Comment: Data Unavailable   No LMP for male patient.  Allergies reviewed: Yes  Medications reviewed: Yes    Medications: Medication refills not needed today.  Pharmacy name entered into [employer]: [id number] [place] [facility] [address]    Clinical concerns:  New Patient Consult - Colonic mass.       Destiny Oconnor CMA            "

## 2023-10-03 NOTE — PROGRESS NOTES
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  There are no other high risk features.  Therefore no benefit to any adjuvant chemotherapy.    Discussed typical follow-up.  We will see every 3 months the first 2 years and then every 6 months until 5 years out.  Check CEA with follow-ups.  Annual CT scans.    Should have colonoscopy prior to takedown of the colostomy and this is the plan with surgery.    Questions answered.    Plan: No benefit to adjuvant chemotherapy  Follow-up with surgery for colonoscopy and takedown of colostomy  Follow-up in 3 months with recheck of labs        ECOG Performance    0 - Independent    Problem List    Patient Active Problem List   Diagnosis    H/O colectomy    Vitamin D deficiency    Obesity    Mixed hyperlipidemia    Hypomagnesemia    Psoriasis    Type 2 diabetes mellitus without complication (H)    Hypocalcemia    Hairy cell leukemia, in remission (H)    Essential hypertension    Current moderate episode of major depressive disorder, unspecified whether recurrent (H)    Atopic dermatitis    Cancer of sigmoid colon (H)     ______________________________________________________________________________    Staging History    Cancer Staging   No matching staging information was found for the patient.    History    Patient is a 72-year-old with past history of hairy cell leukemia status posttreatment over 20 years ago in remission, type 2 diabetes, hypertension and hyperlipidemia who is  referred for evaluation of colon cancer.  He presented with weight loss and diarrhea and had CT scan showing a sigmoid colon mass.  It was felt he would not tolerate colon prep and therefore was taken straight to surgery on September 5.  He had sigmoidectomy and diverting colostomy.  Was in the ER for evaluation of abnormal labs about 2 weeks ago.  After that has been doing really well.  No headaches or dizziness.  No shortness of breath or cough.  Appetite and weight improving.  No abdominal pain.  Colostomy functioning fine.  No new urinary symptoms.  No bleeding or rash.  ECOG status 0.    Past medical history as above.  Past surgical history includes cholecystectomy, tonsillectomy and cyst removal    No other hospitalizations.    He is  and lives in [place].  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  No other personal history of cancer.    Father had pancreatic cancer.  Mother had lung cancer.  No other cancer in the family.        Past History    Past Medical History:   Diagnosis Date    Diabetes (H)     Hypertension     History reviewed. No pertinent family history.   Past Surgical History:   Procedure Laterality Date    IR MISCELLANEOUS PROCEDURE  10/18/2000    LAPAROSCOPIC ASSISTED COLECTOMY N/A 9/5/2023    Procedure: COLECTOMY, PARTIAL LAPAROSCOPIC, LAPAROSCOPIC LYSIS OF ADHESIONS;  Surgeon: Thor Fall DO;  [place] [facility] [place]    RECTAL PROLAPSE REPAIR N/A 9/5/2023    Procedure: open sigmoidectomy,;  Surgeon: Thor Fall DO;  [place] [facility] [place]    RESECT SMALL BOWEL WITH OSTOMY N/A 9/5/2023    Procedure: ostomy creation;  Surgeon: Thor Fall DO;  [place] [facility] [place]    Social History     Socioeconomic History    Marital status:      Spouse name: Not on file    Number of children: Not on file    Years of education: Not on file    Highest education level: Not on file   Occupational History    Not on file  "  Tobacco Use    Smoking status: Never     Passive exposure: Past (Parents smoked)    Smokeless tobacco: Never   Substance and Sexual Activity    Alcohol use: Never    Drug use: Never    Sexual activity: Not on file   Other Topics Concern    Not on file   Social History Narrative    Not on file     Social Determinants of Health     Financial Resource Strain: Not on file   Food Insecurity: Not on file   Transportation Needs: Not on file   Physical Activity: Not on file   Stress: Not on file   Social Connections: Not on file   Interpersonal Safety: Not on file   Housing Stability: Not on file        Allergies    No Known Allergies    Review of Systems    Pertinent items are noted in HPI.      Physical Exam        10/3/2023     2:49 PM   Oncology Vitals   Height 172 cm   Weight 79.379 kg   BSA (m2) 1.94 m2   /62   Pulse 77   Temp 97.9  F (36.6  C)   Temp src Oral   SpO2 97 %   Pain Score 3 (Mild)   Pain Loc Abdomen       /62 (BP Location: Left arm, Patient Position: Sitting, Cuff Size: Adult Regular)   Pulse 77   Temp 97.9  F (36.6  C) (Oral)   Resp 20   Ht 1.715 m (5' 7.5\")   Wt 79.4 kg (175 lb)   SpO2 97%   BMI 27.00 kg/m      General Appearance:    Alert, cooperative, no distress, appears stated age   Head:    Normocephalic, without obvious abnormality, atraumatic   Eyes:    PERRL, conjunctiva/corneas clear, EOM's intact, fundi     benign, both eyes        Ears:    Normal TM's and external ear canals, both ears   Nose:   Nares normal, septum midline, mucosa normal, no drainage    or sinus tenderness   Throat:   Lips, mucosa, and tongue normal; teeth and gums normal   Neck:   Supple, symmetrical, trachea midline, no adenopathy;        thyroid:  No enlargement/tenderness/nodules; no carotid    bruit or JVD   Back:     Symmetric, no curvature, ROM normal, no CVA tenderness   Lungs:     Clear to auscultation bilaterally, respirations unlabored   Chest wall:    No tenderness or deformity   Heart:    " Regular rate and rhythm, S1 and S2 normal, no murmur, rub   or gallop   Abdomen:     Soft, non-tender, bowel sounds active all four quadrants,     no masses, no organomegaly, left colostomy           Extremities:   Extremities normal, atraumatic, no cyanosis or edema   Pulses:   2+ and symmetric all extremities   Skin:   Skin color, texture, turgor normal, no rashes or lesions   Lymph nodes:   Cervical, supraclavicular, and axillary nodes normal   Neurologic:   CNII-XII intact. Normal strength, sensation and reflexes       throughout       Lab Results    SIGMOID COLON, SUTURE BOWENS PROXIMAL MARGIN, LAPAROSCOPIC PARTIAL COLECTOMY/OPEN SIGMOIDECTOMY:  -MODERATELY DIFFERENTIATED ADENOCARCINOMA INVADING THROUGH MUSCULARIS PROPRIA INTO PERICOLIC  TISSUE  -FORTY-TWO LYMPH NODES NEGATIVE FOR METASTATIC CARCINOMA (0/42)  -MARGINS NEGATIVE  -DIVERTICULOSIS  -MISMATCH REPAIR IMMUNOHISTOCHEMICAL STAINS ARE PENDING ON BLOCK A14 AND WILL BE REPORTED IN AN  ADDENDUM  -SEE SYNOPTIC REPORT    Imaging Results    No results found.      Signed by: Everardo Champion MD

## 2023-10-03 NOTE — LETTER
"    10/3/2023         RE: Alok Mcfarland  [address] [facility] [address]        Dear Colleague,    Thank you for referring your patient, Alok Mcfarland, to the [facility]. Please see a copy of my visit note below.    Oncology Rooming Note    October 3, 2023 3:00 PM   Alok Mcfarland is a 72 year old male who presents for:    Chief Complaint   Patient presents with     Oncology Clinic Visit     New Patient Consult - Colonic mass     Initial Vitals: /62 (BP Location: Left arm, Patient Position: Sitting, Cuff Size: Adult Regular)   Pulse 77   Temp 97.9  F (36.6  C) (Oral)   Resp 20   Ht 1.715 m (5' 7.5\")   Wt 79.4 kg (175 lb)   SpO2 97%   BMI 27.00 kg/m   Estimated body mass index is 27 kg/m  as calculated from the following:    Height as of this encounter: 1.715 m (5' 7.5\").    Weight as of this encounter: 79.4 kg (175 lb). Body surface area is 1.94 meters squared.  Mild Pain (3) Comment: Data Unavailable   No LMP for male patient.  Allergies reviewed: Yes  Medications reviewed: Yes    Medications: Medication refills not needed today.  Pharmacy name entered into [employer]: [id number] - [place], [place] - [address] [facility]    Clinical concerns:  New Patient Consult - Colonic mass.       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  There are no other high risk features.  Therefore no benefit to any adjuvant " chemotherapy.    Discussed typical follow-up.  We will see every 3 months the first 2 years and then every 6 months until 5 years out.  Check CEA with follow-ups.  Annual CT scans.    Should have colonoscopy prior to takedown of the colostomy and this is the plan with surgery.    Questions answered.    Plan: No benefit to adjuvant chemotherapy  Follow-up with surgery for colonoscopy and takedown of colostomy  Follow-up in 3 months with recheck of labs        ECOG Performance    0 - Independent    Problem List    Patient Active Problem List   Diagnosis     H/O colectomy     Vitamin D deficiency     Obesity     Mixed hyperlipidemia     Hypomagnesemia     Psoriasis     Type 2 diabetes mellitus without complication (H)     Hypocalcemia     Hairy cell leukemia, in remission (H)     Essential hypertension     Current moderate episode of major depressive disorder, unspecified whether recurrent (H)     Atopic dermatitis     Cancer of sigmoid colon (H)     ______________________________________________________________________________    Staging History    Cancer Staging   No matching staging information was found for the patient.    History    Patient is a 72-year-old with past history of hairy cell leukemia status posttreatment over 20 years ago in remission, type 2 diabetes, hypertension and hyperlipidemia who is referred for evaluation of colon cancer.  He presented with weight loss and diarrhea and had CT scan showing a sigmoid colon mass.  It was felt he would not tolerate colon prep and therefore was taken straight to surgery on September 5.  He had sigmoidectomy and diverting colostomy.  Was in the ER for evaluation of abnormal labs about 2 weeks ago.  After that has been doing really well.  No headaches or dizziness.  No shortness of breath or cough.  Appetite and weight improving.  No abdominal pain.  Colostomy functioning fine.  No new urinary symptoms.  No bleeding or rash.  ECOG status 0.    Past medical history  as above.  Past surgical history includes cholecystectomy, tonsillectomy and cyst removal    No other hospitalizations.    He is  and lives in [place].  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  No other personal history of cancer.    Father had pancreatic cancer.  Mother had lung cancer.  No other cancer in the family.        Past History    Past Medical History:   Diagnosis Date     Diabetes (H)      Hypertension     History reviewed. No pertinent family history.   Past Surgical History:   Procedure Laterality Date     IR MISCELLANEOUS PROCEDURE  10/18/2000     LAPAROSCOPIC ASSISTED COLECTOMY N/A 9/5/2023    Procedure: COLECTOMY, PARTIAL LAPAROSCOPIC, LAPAROSCOPIC LYSIS OF ADHESIONS;  Surgeon: Thor Fall DO;  Location: [facility] [place] [place]     RECTAL PROLAPSE REPAIR N/A 9/5/2023    Procedure: open sigmoidectomy,;  Surgeon: Thor Fall DO;  Location: [facility] [place] [place]     RESECT SMALL BOWEL WITH OSTOMY N/A 9/5/2023    Procedure: ostomy creation;  Surgeon: Thor Fall DO;  Location: [facility] [place] [place]    Social History     Socioeconomic History     Marital status:      Spouse name: Not on file     Number of children: Not on file     Years of education: Not on file     Highest education level: Not on file   Occupational History     Not on file   Tobacco Use     Smoking status: Never     Passive exposure: Past (Parents smoked)     Smokeless tobacco: Never   Substance and Sexual Activity     Alcohol use: Never     Drug use: Never     Sexual activity: Not on file   Other Topics Concern     Not on file   Social History Narrative     Not on file     Social Determinants of Health     Financial Resource Strain: Not on file   Food Insecurity: Not on file   Transportation Needs: Not on file   Physical Activity: Not on file   Stress: Not on file   Social Connections: Not on file   Interpersonal Safety: Not on file   Housing Stability: Not on file     "    Allergies    No Known Allergies    Review of Systems    Pertinent items are noted in HPI.      Physical Exam        10/3/2023     2:49 PM   Oncology Vitals   Height 172 cm   Weight 79.379 kg   BSA (m2) 1.94 m2   /62   Pulse 77   Temp 97.9  F (36.6  C)   Temp src Oral   SpO2 97 %   Pain Score 3 (Mild)   Pain Loc Abdomen       /62 (BP Location: Left arm, Patient Position: Sitting, Cuff Size: Adult Regular)   Pulse 77   Temp 97.9  F (36.6  C) (Oral)   Resp 20   Ht 1.715 m (5' 7.5\")   Wt 79.4 kg (175 lb)   SpO2 97%   BMI 27.00 kg/m      General Appearance:    Alert, cooperative, no distress, appears stated age   Head:    Normocephalic, without obvious abnormality, atraumatic   Eyes:    PERRL, conjunctiva/corneas clear, EOM's intact, fundi     benign, both eyes        Ears:    Normal TM's and external ear canals, both ears   Nose:   Nares normal, septum midline, mucosa normal, no drainage    or sinus tenderness   Throat:   Lips, mucosa, and tongue normal; teeth and gums normal   Neck:   Supple, symmetrical, trachea midline, no adenopathy;        thyroid:  No enlargement/tenderness/nodules; no carotid    bruit or JVD   Back:     Symmetric, no curvature, ROM normal, no CVA tenderness   Lungs:     Clear to auscultation bilaterally, respirations unlabored   Chest wall:    No tenderness or deformity   Heart:    Regular rate and rhythm, S1 and S2 normal, no murmur, rub   or gallop   Abdomen:     Soft, non-tender, bowel sounds active all four quadrants,     no masses, no organomegaly, left colostomy           Extremities:   Extremities normal, atraumatic, no cyanosis or edema   Pulses:   2+ and symmetric all extremities   Skin:   Skin color, texture, turgor normal, no rashes or lesions   Lymph nodes:   Cervical, supraclavicular, and axillary nodes normal   Neurologic:   CNII-XII intact. Normal strength, sensation and reflexes       throughout       Lab Results    SIGMOID COLON, SUTURE BOWENS PROXIMAL " MARGIN, LAPAROSCOPIC PARTIAL COLECTOMY/OPEN SIGMOIDECTOMY:  -MODERATELY DIFFERENTIATED ADENOCARCINOMA INVADING THROUGH MUSCULARIS PROPRIA INTO PERICOLIC  TISSUE  -FORTY-TWO LYMPH NODES NEGATIVE FOR METASTATIC CARCINOMA (0/42)  -MARGINS NEGATIVE  -DIVERTICULOSIS  -MISMATCH REPAIR IMMUNOHISTOCHEMICAL STAINS ARE PENDING ON BLOCK A14 AND WILL BE REPORTED IN AN  ADDENDUM  -SEE SYNOPTIC REPORT    Imaging Results    No results found.      Signed by: Everardo Champion MD      Again, thank you for allowing me to participate in the care of your patient.        Sincerely,        Everardo Champion MD

## 2023-10-11 ENCOUNTER — LAB REQUISITION (OUTPATIENT)
Dept: LAB | Facility: CLINIC | Age: 73
End: 2023-10-11

## 2023-10-11 DIAGNOSIS — Z93.3 COLOSTOMY STATUS (H): ICD-10-CM

## 2023-10-11 DIAGNOSIS — E83.42 HYPOMAGNESEMIA: ICD-10-CM

## 2023-10-11 DIAGNOSIS — E87.6 HYPOKALEMIA: ICD-10-CM

## 2023-10-11 LAB
ANION GAP SERPL CALCULATED.3IONS-SCNC: 12 MMOL/L (ref 7–15)
BUN SERPL-MCNC: 14 MG/DL (ref 8–23)
CALCIUM SERPL-MCNC: 8.8 MG/DL (ref 8.8–10.2)
CHLORIDE SERPL-SCNC: 103 MMOL/L (ref 98–107)
CREAT SERPL-MCNC: 1.15 MG/DL (ref 0.67–1.17)
DEPRECATED HCO3 PLAS-SCNC: 24 MMOL/L (ref 22–29)
EGFRCR SERPLBLD CKD-EPI 2021: 68 ML/MIN/1.73M2
GLUCOSE SERPL-MCNC: 139 MG/DL (ref 70–99)
MAGNESIUM SERPL-MCNC: 1.6 MG/DL (ref 1.7–2.3)
POTASSIUM SERPL-SCNC: 4.1 MMOL/L (ref 3.4–5.3)
SODIUM SERPL-SCNC: 139 MMOL/L (ref 135–145)

## 2023-10-11 PROCEDURE — 83735 ASSAY OF MAGNESIUM: CPT | Performed by: FAMILY MEDICINE

## 2023-10-11 PROCEDURE — 80048 BASIC METABOLIC PNL TOTAL CA: CPT | Performed by: FAMILY MEDICINE

## 2023-10-18 ENCOUNTER — LAB REQUISITION (OUTPATIENT)
Dept: LAB | Facility: CLINIC | Age: 73
End: 2023-10-18

## 2023-10-18 DIAGNOSIS — E83.42 HYPOMAGNESEMIA: ICD-10-CM

## 2023-10-18 DIAGNOSIS — E87.6 HYPOKALEMIA: ICD-10-CM

## 2023-10-18 LAB
ANION GAP SERPL CALCULATED.3IONS-SCNC: 13 MMOL/L (ref 7–15)
BUN SERPL-MCNC: 17 MG/DL (ref 8–23)
CALCIUM SERPL-MCNC: 8.9 MG/DL (ref 8.8–10.2)
CHLORIDE SERPL-SCNC: 103 MMOL/L (ref 98–107)
CREAT SERPL-MCNC: 1.06 MG/DL (ref 0.67–1.17)
DEPRECATED HCO3 PLAS-SCNC: 24 MMOL/L (ref 22–29)
EGFRCR SERPLBLD CKD-EPI 2021: 75 ML/MIN/1.73M2
GLUCOSE SERPL-MCNC: 120 MG/DL (ref 70–99)
MAGNESIUM SERPL-MCNC: 1.5 MG/DL (ref 1.7–2.3)
POTASSIUM SERPL-SCNC: 3.9 MMOL/L (ref 3.4–5.3)
SODIUM SERPL-SCNC: 140 MMOL/L (ref 135–145)

## 2023-10-18 PROCEDURE — 80048 BASIC METABOLIC PNL TOTAL CA: CPT | Performed by: FAMILY MEDICINE

## 2023-10-18 PROCEDURE — 83735 ASSAY OF MAGNESIUM: CPT | Performed by: FAMILY MEDICINE

## 2023-11-01 ENCOUNTER — LAB REQUISITION (OUTPATIENT)
Dept: LAB | Facility: CLINIC | Age: 73
End: 2023-11-01

## 2023-11-01 DIAGNOSIS — E87.6 HYPOKALEMIA: ICD-10-CM

## 2023-11-01 DIAGNOSIS — E83.42 HYPOMAGNESEMIA: ICD-10-CM

## 2023-11-01 LAB
MAGNESIUM SERPL-MCNC: 1.6 MG/DL (ref 1.7–2.3)
POTASSIUM SERPL-SCNC: 4.3 MMOL/L (ref 3.4–5.3)

## 2023-11-01 PROCEDURE — 84132 ASSAY OF SERUM POTASSIUM: CPT | Performed by: FAMILY MEDICINE

## 2023-11-01 PROCEDURE — 83735 ASSAY OF MAGNESIUM: CPT | Performed by: FAMILY MEDICINE

## 2024-01-08 ENCOUNTER — LAB (OUTPATIENT)
Dept: INFUSION THERAPY | Facility: HOSPITAL | Age: 74
End: 2024-01-08
Attending: INTERNAL MEDICINE
Payer: COMMERCIAL

## 2024-01-08 ENCOUNTER — ONCOLOGY VISIT (OUTPATIENT)
Dept: ONCOLOGY | Facility: HOSPITAL | Age: 74
End: 2024-01-08
Attending: INTERNAL MEDICINE
Payer: COMMERCIAL

## 2024-01-08 VITALS
RESPIRATION RATE: 16 BRPM | HEART RATE: 59 BPM | DIASTOLIC BLOOD PRESSURE: 73 MMHG | WEIGHT: 205.85 LBS | BODY MASS INDEX: 31.2 KG/M2 | HEIGHT: 68 IN | OXYGEN SATURATION: 97 % | SYSTOLIC BLOOD PRESSURE: 167 MMHG | TEMPERATURE: 98.2 F

## 2024-01-08 DIAGNOSIS — D50.9 MICROCYTIC ANEMIA: ICD-10-CM

## 2024-01-08 DIAGNOSIS — C18.7 CANCER OF SIGMOID COLON (H): ICD-10-CM

## 2024-01-08 DIAGNOSIS — D50.9 MICROCYTIC ANEMIA: Primary | ICD-10-CM

## 2024-01-08 LAB
ALBUMIN SERPL BCG-MCNC: 4.6 G/DL (ref 3.5–5.2)
ALP SERPL-CCNC: 94 U/L (ref 40–150)
ALT SERPL W P-5'-P-CCNC: 12 U/L (ref 0–70)
ANION GAP SERPL CALCULATED.3IONS-SCNC: 8 MMOL/L (ref 7–15)
AST SERPL W P-5'-P-CCNC: 15 U/L (ref 0–45)
BASOPHILS # BLD AUTO: 0.1 10E3/UL (ref 0–0.2)
BASOPHILS NFR BLD AUTO: 1 %
BILIRUB SERPL-MCNC: 0.5 MG/DL
BUN SERPL-MCNC: 21.7 MG/DL (ref 8–23)
CALCIUM SERPL-MCNC: 9.2 MG/DL (ref 8.8–10.2)
CHLORIDE SERPL-SCNC: 105 MMOL/L (ref 98–107)
CREAT SERPL-MCNC: 1.35 MG/DL (ref 0.67–1.17)
DEPRECATED HCO3 PLAS-SCNC: 26 MMOL/L (ref 22–29)
EGFRCR SERPLBLD CKD-EPI 2021: 56 ML/MIN/1.73M2
EOSINOPHIL # BLD AUTO: 0.1 10E3/UL (ref 0–0.7)
EOSINOPHIL NFR BLD AUTO: 2 %
ERYTHROCYTE [DISTWIDTH] IN BLOOD BY AUTOMATED COUNT: 17.5 % (ref 10–15)
GLUCOSE SERPL-MCNC: 86 MG/DL (ref 70–99)
HCT VFR BLD AUTO: 34.2 % (ref 40–53)
HGB BLD-MCNC: 10.1 G/DL (ref 13.3–17.7)
IMM GRANULOCYTES # BLD: 0 10E3/UL
IMM GRANULOCYTES NFR BLD: 0 %
LYMPHOCYTES # BLD AUTO: 1.4 10E3/UL (ref 0.8–5.3)
LYMPHOCYTES NFR BLD AUTO: 24 %
MCH RBC QN AUTO: 19.3 PG (ref 26.5–33)
MCHC RBC AUTO-ENTMCNC: 29.5 G/DL (ref 31.5–36.5)
MCV RBC AUTO: 65 FL (ref 78–100)
MONOCYTES # BLD AUTO: 0.6 10E3/UL (ref 0–1.3)
MONOCYTES NFR BLD AUTO: 11 %
NEUTROPHILS # BLD AUTO: 3.6 10E3/UL (ref 1.6–8.3)
NEUTROPHILS NFR BLD AUTO: 62 %
NRBC # BLD AUTO: 0 10E3/UL
NRBC BLD AUTO-RTO: 0 /100
PLATELET # BLD AUTO: 195 10E3/UL (ref 150–450)
POTASSIUM SERPL-SCNC: 4.7 MMOL/L (ref 3.4–5.3)
PROT SERPL-MCNC: 7.6 G/DL (ref 6.4–8.3)
RBC # BLD AUTO: 5.23 10E6/UL (ref 4.4–5.9)
SODIUM SERPL-SCNC: 139 MMOL/L (ref 135–145)
WBC # BLD AUTO: 5.8 10E3/UL (ref 4–11)

## 2024-01-08 PROCEDURE — G0463 HOSPITAL OUTPT CLINIC VISIT: HCPCS | Performed by: NURSE PRACTITIONER

## 2024-01-08 PROCEDURE — 36415 COLL VENOUS BLD VENIPUNCTURE: CPT

## 2024-01-08 PROCEDURE — 82728 ASSAY OF FERRITIN: CPT

## 2024-01-08 PROCEDURE — 99214 OFFICE O/P EST MOD 30 MIN: CPT | Performed by: NURSE PRACTITIONER

## 2024-01-08 PROCEDURE — 85025 COMPLETE CBC W/AUTO DIFF WBC: CPT

## 2024-01-08 PROCEDURE — 80053 COMPREHEN METABOLIC PANEL: CPT

## 2024-01-08 PROCEDURE — 82378 CARCINOEMBRYONIC ANTIGEN: CPT

## 2024-01-08 RX ORDER — MAGNESIUM 64 MG (MAGNESIUM CHLORIDE) TABLET,DELAYED RELEASE
2 DAILY
COMMUNITY
Start: 2023-12-28

## 2024-01-08 ASSESSMENT — PAIN SCALES - GENERAL: PAINLEVEL: NO PAIN (0)

## 2024-01-08 NOTE — PROGRESS NOTES
"Oncology Rooming Note    January 8, 2024 1:32 PM   Alok Mcfarland is a 73 year old male who presents for:    Chief Complaint   Patient presents with    Oncology Clinic Visit     Return visit 3 months with lab. Cancer of sigmoid colon.     Initial Vitals: BP (!) 167/73 (BP Location: Left arm, Patient Position: Sitting, Cuff Size: Adult Regular)   Pulse 59   Temp 98.2  F (36.8  C) (Oral)   Resp 16   Ht 1.715 m (5' 7.5\")   Wt 93.4 kg (205 lb 13.6 oz)   SpO2 97%   BMI 31.76 kg/m   Estimated body mass index is 31.76 kg/m  as calculated from the following:    Height as of this encounter: 1.715 m (5' 7.5\").    Weight as of this encounter: 93.4 kg (205 lb 13.6 oz). Body surface area is 2.11 meters squared.  No Pain (0) Comment: Data Unavailable   No LMP for male patient.  Allergies reviewed: Yes  Medications reviewed: Yes    Medications: Medication refills not needed today.  Pharmacy name entered into [employer]: [id number] [address] [facility]    Frailty Screening:   Is the patient here for a new oncology consult visit in cancer care? 2. No      Clinical concerns: Return visit 3 months with lab. Cancer of sigmoid colon.      Jeanne Mckenzie CMA ([facility])              "

## 2024-01-08 NOTE — PROGRESS NOTES
[facility] Hematology and Oncology Progress Note    Patient: Alok Mcfarland  MRN: [id number]  Date of Service: Jan 8, 2024          Reason for Visit    Chief Complaint   Patient presents with    Oncology Clinic Visit     Return visit 3 months with lab. Cancer of sigmoid colon.       Assessment and Plan     Cancer Staging   No matching staging information was found for the patient.    1.  Colon cancer, stage II: Patient had his surgery September 2023.  He is now on observation.  He will continue to be seen every 3 months for the first 2 years then every 6 months to complete 5 years.  We will do an annual CT scan in August roughly for the first 3 years.  We will do tumor marker checks.  Patient was given information about a clinical study we have where they are checking for circulating tumor cells.  He will think about whether he wants to do that or not.  Patient will return in 3 months with labs and to see Dr. Champion.  Sooner with any worrisome symptoms.    2.  Microcytic anemia: Likely from iron deficiency.  It is getting better slowly since surgery.  Check a ferritin level. Encourage him to take iron supplement daily. Monitor for bleeding.     ECOG Performance    0 - Independent    Distress Screening (within last 30 days)    No data recorded     Pain  Pain Score: No Pain (0)    Problem List    Patient Active Problem List   Diagnosis    H/O colectomy    Vitamin D deficiency    Obesity    Mixed hyperlipidemia    Hypomagnesemia    Psoriasis    Type 2 diabetes mellitus without complication (H)    Hypocalcemia    Hairy cell leukemia, in remission (H)    Essential hypertension    Current moderate episode of major depressive disorder, unspecified whether recurrent (H)    Atopic dermatitis    Cancer of sigmoid colon (H)        ______________________________________________________________________________    History of Present Illness    Oncologist: Dr. Champion    Diagnosis: Colon cancer, sigmoid.  Moderately  "differentiated adenocarcinoma.  T3 N0 M0.  Grade 2.  42 lymph nodes removed and all of them were negative.  Low probability of MSI-H    Remote history of hairy cell leukemia.  Patient is status post treatment over 20 years ago.  Has been in remission since.    Treatment:   -9/5/23: Sigmoidectomy and diverting colostomy.  Observation    Interim History:   Patient is here today for a follow-up visit.  He was last seen about 3 months ago.  Overall he states he has been feeling pretty good.  He states that he is gaining some weight and wants to now start losing weight.  He denies any new bone or back pain.  Denies any blood in his stool.  Still has his colostomy.  Denies any abdominal pain.        Review of Systems    Pertinent items are noted in HPI.    Past History    Past Medical History:   Diagnosis Date    Diabetes (H)     Hypertension        PHYSICAL EXAM:  BP (!) 167/73 (BP Location: Left arm, Patient Position: Sitting, Cuff Size: Adult Regular)   Pulse 59   Temp 98.2  F (36.8  C) (Oral)   Resp 16   Ht 1.715 m (5' 7.5\")   Wt 93.4 kg (205 lb 13.6 oz)   SpO2 97%   BMI 31.76 kg/m    GENERAL: no acute distress. Cooperative in conversation. Here alone  RESP: lungs are clear bilaterally per auscultation. Regular respiratory rate. No wheezes or rhonchi.  CV: Regular, rate and rhythm. No murmurs.  ABD: soft, nontender. Ostomy intact with brown stool.   MUSCULOSKELETAL: No lower extremity swelling.   NEURO: non focal. Alert and oriented x3.   PSYCH: within normal limits. No depression or anxiety.  SKIN: warm dry intact   LYMPH: no cervical, supraclavicular lymphadenopathy          Lab Results    Recent Results (from the past 168 hour(s))   Comprehensive metabolic panel (BMP + Alb, Alk Phos, ALT, AST, Total. Bili, TP)   Result Value Ref Range    Sodium 139 135 - 145 mmol/L    Potassium 4.7 3.4 - 5.3 mmol/L    Carbon Dioxide (CO2) 26 22 - 29 mmol/L    Anion Gap 8 7 - 15 mmol/L    Urea Nitrogen 21.7 8.0 - 23.0 mg/dL "    Creatinine 1.35 (H) 0.67 - 1.17 mg/dL    GFR Estimate 56 (L) >60 mL/min/1.73m2    Calcium 9.2 8.8 - 10.2 mg/dL    Chloride 105 98 - 107 mmol/L    Glucose 86 70 - 99 mg/dL    Alkaline Phosphatase 94 40 - 150 U/L    AST 15 0 - 45 U/L    ALT 12 0 - 70 U/L    Protein Total 7.6 6.4 - 8.3 g/dL    Albumin 4.6 3.5 - 5.2 g/dL    Bilirubin Total 0.5 <=1.2 mg/dL   CBC with platelets and differential   Result Value Ref Range    WBC Count 5.8 4.0 - 11.0 10e3/uL    RBC Count 5.23 4.40 - 5.90 10e6/uL    Hemoglobin 10.1 (L) 13.3 - 17.7 g/dL    Hematocrit 34.2 (L) 40.0 - 53.0 %    MCV 65 (L) 78 - 100 fL    MCH 19.3 (L) 26.5 - 33.0 pg    MCHC 29.5 (L) 31.5 - 36.5 g/dL    RDW 17.5 (H) 10.0 - 15.0 %    Platelet Count 195 150 - 450 10e3/uL    % Neutrophils 62 %    % Lymphocytes 24 %    % Monocytes 11 %    % Eosinophils 2 %    % Basophils 1 %    % Immature Granulocytes 0 %    NRBCs per 100 WBC 0 <1 /100    Absolute Neutrophils 3.6 1.6 - 8.3 10e3/uL    Absolute Lymphocytes 1.4 0.8 - 5.3 10e3/uL    Absolute Monocytes 0.6 0.0 - 1.3 10e3/uL    Absolute Eosinophils 0.1 0.0 - 0.7 10e3/uL    Absolute Basophils 0.1 0.0 - 0.2 10e3/uL    Absolute Immature Granulocytes 0.0 <=0.4 10e3/uL    Absolute NRBCs 0.0 10e3/uL     Ferritin and CEA level pending    Imaging    No results found.      Signed by: VANDANA Milner CNP

## 2024-01-08 NOTE — LETTER
"    1/8/2024         RE: Alok Mcfarland  [address] [facility] [address]        Dear Colleague,    Thank you for referring your patient, Alok Mcfarland, to the [facility] CANCER [facility]. Please see a copy of my visit note below.    Oncology Rooming Note    January 8, 2024 1:32 PM   Alok Mcfarland is a 73 year old male who presents for:    Chief Complaint   Patient presents with     Oncology Clinic Visit     Return visit 3 months with lab. Cancer of sigmoid colon.     Initial Vitals: BP (!) 167/73 (BP Location: Left arm, Patient Position: Sitting, Cuff Size: Adult Regular)   Pulse 59   Temp 98.2  F (36.8  C) (Oral)   Resp 16   Ht 1.715 m (5' 7.5\")   Wt 93.4 kg (205 lb 13.6 oz)   SpO2 97%   BMI 31.76 kg/m   Estimated body mass index is 31.76 kg/m  as calculated from the following:    Height as of this encounter: 1.715 m (5' 7.5\").    Weight as of this encounter: 93.4 kg (205 lb 13.6 oz). Body surface area is 2.11 meters squared.  No Pain (0) Comment: Data Unavailable   No LMP for male patient.  Allergies reviewed: Yes  Medications reviewed: Yes    Medications: Medication refills not needed today.  Pharmacy name entered into [employer]: [id number] [place], [place] - [address] [facility]    Frailty Screening:   Is the patient here for a new oncology consult visit in cancer care? 2. No      Clinical concerns: Return visit 3 months with lab. Cancer of sigmoid colon.      Jeanne Mckenzie CMA (AAMA)                [facility] Hematology and Oncology Progress Note    Patient: Alok Mcfarland  MRN: [id number]  Date of Service: Jan 8, 2024          Reason for Visit    Chief Complaint   Patient presents with     Oncology Clinic Visit     Return visit 3 months with lab. Cancer of sigmoid colon.       Assessment and Plan     Cancer Staging   No matching staging information was found for the patient.    1.  Colon cancer, stage II: Patient had his surgery September 2023.  He is " now on observation.  He will continue to be seen every 3 months for the first 2 years then every 6 months to complete 5 years.  We will do an annual CT scan in August roughly for the first 3 years.  We will do tumor marker checks.  Patient was given information about a clinical study we have where they are checking for circulating tumor cells.  He will think about whether he wants to do that or not.  Patient will return in 3 months with labs and to see Dr. Champion.  Sooner with any worrisome symptoms.    2.  Microcytic anemia: Likely from iron deficiency.  It is getting better slowly since surgery.  Check a ferritin level. Encourage him to take iron supplement daily. Monitor for bleeding.     ECOG Performance    0 - Independent    Distress Screening (within last 30 days)    No data recorded     Pain  Pain Score: No Pain (0)    Problem List    Patient Active Problem List   Diagnosis     H/O colectomy     Vitamin D deficiency     Obesity     Mixed hyperlipidemia     Hypomagnesemia     Psoriasis     Type 2 diabetes mellitus without complication (H)     Hypocalcemia     Hairy cell leukemia, in remission (H)     Essential hypertension     Current moderate episode of major depressive disorder, unspecified whether recurrent (H)     Atopic dermatitis     Cancer of sigmoid colon (H)        ______________________________________________________________________________    History of Present Illness    Oncologist: Dr. Champion    Diagnosis: Colon cancer, sigmoid.  Moderately differentiated adenocarcinoma.  T3 N0 M0.  Grade 2.  42 lymph nodes removed and all of them were negative.  Low probability of MSI-H    Remote history of hairy cell leukemia.  Patient is status post treatment over 20 years ago.  Has been in remission since.    Treatment:   -9/5/23: Sigmoidectomy and diverting colostomy.  Observation    Interim History:   Patient is here today for a follow-up visit.  He was last seen about 3 months ago.  Overall he states  "he has been feeling pretty good.  He states that he is gaining some weight and wants to now start losing weight.  He denies any new bone or back pain.  Denies any blood in his stool.  Still has his colostomy.  Denies any abdominal pain.        Review of Systems    Pertinent items are noted in HPI.    Past History    Past Medical History:   Diagnosis Date     Diabetes (H)      Hypertension        PHYSICAL EXAM:  BP (!) 167/73 (BP Location: Left arm, Patient Position: Sitting, Cuff Size: Adult Regular)   Pulse 59   Temp 98.2  F (36.8  C) (Oral)   Resp 16   Ht 1.715 m (5' 7.5\")   Wt 93.4 kg (205 lb 13.6 oz)   SpO2 97%   BMI 31.76 kg/m    GENERAL: no acute distress. Cooperative in conversation. Here alone  RESP: lungs are clear bilaterally per auscultation. Regular respiratory rate. No wheezes or rhonchi.  CV: Regular, rate and rhythm. No murmurs.  ABD: soft, nontender. Ostomy intact with brown stool.   MUSCULOSKELETAL: No lower extremity swelling.   NEURO: non focal. Alert and oriented x3.   PSYCH: within normal limits. No depression or anxiety.  SKIN: warm dry intact   LYMPH: no cervical, supraclavicular lymphadenopathy          Lab Results    Recent Results (from the past 168 hour(s))   Comprehensive metabolic panel (BMP + Alb, Alk Phos, ALT, AST, Total. Bili, TP)   Result Value Ref Range    Sodium 139 135 - 145 mmol/L    Potassium 4.7 3.4 - 5.3 mmol/L    Carbon Dioxide (CO2) 26 22 - 29 mmol/L    Anion Gap 8 7 - 15 mmol/L    Urea Nitrogen 21.7 8.0 - 23.0 mg/dL    Creatinine 1.35 (H) 0.67 - 1.17 mg/dL    GFR Estimate 56 (L) >60 mL/min/1.73m2    Calcium 9.2 8.8 - 10.2 mg/dL    Chloride 105 98 - 107 mmol/L    Glucose 86 70 - 99 mg/dL    Alkaline Phosphatase 94 40 - 150 U/L    AST 15 0 - 45 U/L    ALT 12 0 - 70 U/L    Protein Total 7.6 6.4 - 8.3 g/dL    Albumin 4.6 3.5 - 5.2 g/dL    Bilirubin Total 0.5 <=1.2 mg/dL   CBC with platelets and differential   Result Value Ref Range    WBC Count 5.8 4.0 - 11.0 10e3/uL    " RBC Count 5.23 4.40 - 5.90 10e6/uL    Hemoglobin 10.1 (L) 13.3 - 17.7 g/dL    Hematocrit 34.2 (L) 40.0 - 53.0 %    MCV 65 (L) 78 - 100 fL    MCH 19.3 (L) 26.5 - 33.0 pg    MCHC 29.5 (L) 31.5 - 36.5 g/dL    RDW 17.5 (H) 10.0 - 15.0 %    Platelet Count 195 150 - 450 10e3/uL    % Neutrophils 62 %    % Lymphocytes 24 %    % Monocytes 11 %    % Eosinophils 2 %    % Basophils 1 %    % Immature Granulocytes 0 %    NRBCs per 100 WBC 0 <1 /100    Absolute Neutrophils 3.6 1.6 - 8.3 10e3/uL    Absolute Lymphocytes 1.4 0.8 - 5.3 10e3/uL    Absolute Monocytes 0.6 0.0 - 1.3 10e3/uL    Absolute Eosinophils 0.1 0.0 - 0.7 10e3/uL    Absolute Basophils 0.1 0.0 - 0.2 10e3/uL    Absolute Immature Granulocytes 0.0 <=0.4 10e3/uL    Absolute NRBCs 0.0 10e3/uL     Ferritin and CEA level pending    Imaging    No results found.      Signed by: VANDANA Milner CNP      Again, thank you for allowing me to participate in the care of your patient.        Sincerely,        VANDANA Milner CNP

## 2024-01-09 ENCOUNTER — TELEPHONE (OUTPATIENT)
Dept: ONCOLOGY | Facility: HOSPITAL | Age: 74
End: 2024-01-09
Payer: COMMERCIAL

## 2024-01-09 DIAGNOSIS — D50.0 IRON DEFICIENCY ANEMIA DUE TO CHRONIC BLOOD LOSS: Primary | ICD-10-CM

## 2024-01-09 LAB
CEA SERPL-MCNC: 0.6 NG/ML
FERRITIN SERPL-MCNC: 7 NG/ML (ref 31–409)

## 2024-01-09 RX ORDER — FERROUS GLUCONATE 324(38)MG
324 TABLET ORAL
Qty: 30 TABLET | Refills: 2 | Status: SHIPPED | OUTPATIENT
Start: 2024-01-09

## 2024-01-09 NOTE — TELEPHONE ENCOUNTER
[facility]: Cancer Care                                                                                          I call Darin to report the following per Denice LUCIANO: I saw him yesterday. I added on a ferritin and it does show he is iron deficient. I breifly talked to him about starting iron, but I really think he should start one a day.     I was unable to reach Darin directly, I left him a detailed voice message. I advised him to call us back should he have any questions or concerns. Iron script has been sent to his local pharmacy for .     Signature:  Verena Dillard RN Care Coordinator  [facility]  Cancer [facility]

## 2024-03-19 ENCOUNTER — PATIENT OUTREACH (OUTPATIENT)
Dept: ONCOLOGY | Facility: HOSPITAL | Age: 74
End: 2024-03-19
Payer: COMMERCIAL

## 2024-04-15 ENCOUNTER — ONCOLOGY VISIT (OUTPATIENT)
Dept: ONCOLOGY | Facility: HOSPITAL | Age: 74
End: 2024-04-15
Attending: NURSE PRACTITIONER
Payer: COMMERCIAL

## 2024-04-15 ENCOUNTER — PATIENT OUTREACH (OUTPATIENT)
Dept: ONCOLOGY | Facility: HOSPITAL | Age: 74
End: 2024-04-15

## 2024-04-15 ENCOUNTER — LAB (OUTPATIENT)
Dept: INFUSION THERAPY | Facility: HOSPITAL | Age: 74
End: 2024-04-15
Attending: INTERNAL MEDICINE
Payer: COMMERCIAL

## 2024-04-15 VITALS
WEIGHT: 218 LBS | HEIGHT: 68 IN | TEMPERATURE: 97.1 F | RESPIRATION RATE: 20 BRPM | HEART RATE: 57 BPM | OXYGEN SATURATION: 96 % | BODY MASS INDEX: 33.04 KG/M2 | SYSTOLIC BLOOD PRESSURE: 152 MMHG | DIASTOLIC BLOOD PRESSURE: 72 MMHG

## 2024-04-15 DIAGNOSIS — C18.7 CANCER OF SIGMOID COLON (H): Primary | ICD-10-CM

## 2024-04-15 DIAGNOSIS — C18.7 CANCER OF SIGMOID COLON (H): ICD-10-CM

## 2024-04-15 DIAGNOSIS — D50.9 MICROCYTIC ANEMIA: ICD-10-CM

## 2024-04-15 LAB
ALBUMIN SERPL BCG-MCNC: 4.4 G/DL (ref 3.5–5.2)
ALP SERPL-CCNC: 93 U/L (ref 40–150)
ALT SERPL W P-5'-P-CCNC: 15 U/L (ref 0–70)
ANION GAP SERPL CALCULATED.3IONS-SCNC: 10 MMOL/L (ref 7–15)
AST SERPL W P-5'-P-CCNC: 15 U/L (ref 0–45)
BASOPHILS # BLD AUTO: 0 10E3/UL (ref 0–0.2)
BASOPHILS NFR BLD AUTO: 1 %
BILIRUB SERPL-MCNC: 0.4 MG/DL
BUN SERPL-MCNC: 24.5 MG/DL (ref 8–23)
CALCIUM SERPL-MCNC: 8.8 MG/DL (ref 8.8–10.2)
CHLORIDE SERPL-SCNC: 102 MMOL/L (ref 98–107)
CREAT SERPL-MCNC: 1.35 MG/DL (ref 0.67–1.17)
DEPRECATED HCO3 PLAS-SCNC: 24 MMOL/L (ref 22–29)
EGFRCR SERPLBLD CKD-EPI 2021: 56 ML/MIN/1.73M2
EOSINOPHIL # BLD AUTO: 0.1 10E3/UL (ref 0–0.7)
EOSINOPHIL NFR BLD AUTO: 1 %
ERYTHROCYTE [DISTWIDTH] IN BLOOD BY AUTOMATED COUNT: 17.8 % (ref 10–15)
GLUCOSE SERPL-MCNC: 193 MG/DL (ref 70–99)
HCT VFR BLD AUTO: 39.7 % (ref 40–53)
HGB BLD-MCNC: 13.2 G/DL (ref 13.3–17.7)
IMM GRANULOCYTES # BLD: 0 10E3/UL
IMM GRANULOCYTES NFR BLD: 1 %
LYMPHOCYTES # BLD AUTO: 1.2 10E3/UL (ref 0.8–5.3)
LYMPHOCYTES NFR BLD AUTO: 21 %
MCH RBC QN AUTO: 24.3 PG (ref 26.5–33)
MCHC RBC AUTO-ENTMCNC: 33.2 G/DL (ref 31.5–36.5)
MCV RBC AUTO: 73 FL (ref 78–100)
MONOCYTES # BLD AUTO: 0.5 10E3/UL (ref 0–1.3)
MONOCYTES NFR BLD AUTO: 9 %
NEUTROPHILS # BLD AUTO: 4 10E3/UL (ref 1.6–8.3)
NEUTROPHILS NFR BLD AUTO: 67 %
NRBC # BLD AUTO: 0 10E3/UL
NRBC BLD AUTO-RTO: 0 /100
PLATELET # BLD AUTO: 142 10E3/UL (ref 150–450)
POTASSIUM SERPL-SCNC: 4.6 MMOL/L (ref 3.4–5.3)
PROT SERPL-MCNC: 7.5 G/DL (ref 6.4–8.3)
RBC # BLD AUTO: 5.44 10E6/UL (ref 4.4–5.9)
SODIUM SERPL-SCNC: 136 MMOL/L (ref 135–145)
WBC # BLD AUTO: 5.9 10E3/UL (ref 4–11)

## 2024-04-15 PROCEDURE — 99214 OFFICE O/P EST MOD 30 MIN: CPT | Performed by: INTERNAL MEDICINE

## 2024-04-15 PROCEDURE — 85025 COMPLETE CBC W/AUTO DIFF WBC: CPT

## 2024-04-15 PROCEDURE — G0463 HOSPITAL OUTPT CLINIC VISIT: HCPCS | Performed by: INTERNAL MEDICINE

## 2024-04-15 PROCEDURE — 82247 BILIRUBIN TOTAL: CPT

## 2024-04-15 PROCEDURE — 36415 COLL VENOUS BLD VENIPUNCTURE: CPT

## 2024-04-15 PROCEDURE — 82378 CARCINOEMBRYONIC ANTIGEN: CPT

## 2024-04-15 ASSESSMENT — PAIN SCALES - GENERAL: PAINLEVEL: NO PAIN (0)

## 2024-04-15 NOTE — LETTER
"    4/15/2024         RE: Alok Mcfarland  [address] [facility] [address]        Dear Colleague,    Thank you for referring your patient, Alok Mcfarland, to the [facility] CANCER [facility]. Please see a copy of my visit note below.    Oncology Rooming Note    April 15, 2024 12:51 PM   Alok Mcfarland is a 73 year old male who presents for:    Chief Complaint   Patient presents with     Oncology Clinic Visit     3 month follow up with labs related to Cancer of sigmoid colon; Microcytic anemia       Initial Vitals: BP (!) 152/72 (BP Location: Left arm, Patient Position: Sitting, Cuff Size: Adult Large)   Pulse 57   Temp 97.1  F (36.2  C) (Tympanic)   Resp 20   Ht 1.715 m (5' 7.5\")   Wt 98.9 kg (218 lb)   SpO2 96%   BMI 33.64 kg/m   Estimated body mass index is 33.64 kg/m  as calculated from the following:    Height as of this encounter: 1.715 m (5' 7.5\").    Weight as of this encounter: 98.9 kg (218 lb). Body surface area is 2.17 meters squared.  No Pain (0) Comment: Data Unavailable   No LMP for male patient.  Allergies reviewed: Yes  Medications reviewed: Yes    Medications: Medication refills not needed today.  Pharmacy name entered into [employer]: [id number] [place], [place] - [address]    Frailty Screening:   Is the patient here for a new oncology consult visit in cancer care? 2. No      Clinical concerns: 3 month follow up with labs related to Cancer of sigmoid colon; Microcytic anemia      MELINA BUSCH [facility] Hematology and Oncology Consult Note    Patient: Alok Mcfarland  MRN: [id number]  Date of Service: 10/03/2023      Reason for Visit    Chief Complaint   Patient presents with     Oncology Clinic Visit     3 month follow up with labs related to Cancer of sigmoid colon; Microcytic anemia         Assessment/Plan    T3 N0 M0, stage II sigmoid colon cancer status post sigmoidectomy with diverting colostomy on September 5, " 2023  Previous history of hairy cell leukemia in remission    Patient doing well with no concern for recurrence of colon cancer.  He is reassured.  Labs are pending today.  Will see him back in 3 months with recheck of the CEA level.    He will contact surgeons office for colonoscopy and takedown of colostomy.    Plan: As above    Measurable disease: None postoperatively    Current therapy: Observation          ECOG Performance    0 - Independent    Problem List    Patient Active Problem List   Diagnosis     H/O colectomy     Vitamin D deficiency     Obesity     Mixed hyperlipidemia     Hypomagnesemia     Psoriasis     Type 2 diabetes mellitus without complication (H)     Hypocalcemia     Hairy cell leukemia, in remission (H)     Essential hypertension     Current moderate episode of major depressive disorder, unspecified whether recurrent (H)     Atopic dermatitis     Cancer of sigmoid colon (H)     ______________________________________________________________________________    Staging History     Cancer Staging   No matching staging information was found for the patient.    History    Darin is here for reevaluation.  He was seen 3 months ago.  Feeling okay.  Colostomy is functioning fine.  No other abdominal pain.  No problems with his bowel movements.  ECOG status is 0.        October 2023:    Patient is a 72-year-old with past history of hairy cell leukemia status posttreatment over 20 years ago in remission, type 2 diabetes, hypertension and hyperlipidemia who is referred for evaluation of colon cancer.  He presented with weight loss and diarrhea and had CT scan showing a sigmoid colon mass.  It was felt he would not tolerate colon prep and therefore was taken straight to surgery on September 5.  He had sigmoidectomy and diverting colostomy.  Was in the ER for evaluation of abnormal labs about 2 weeks ago.  After that has been doing really well.  No headaches or dizziness.  No shortness of breath or cough.   Appetite and weight improving.  No abdominal pain.  Colostomy functioning fine.  No new urinary symptoms.  No bleeding or rash.  ECOG status 0.    Past medical history as above.  Past surgical history includes cholecystectomy, tonsillectomy and cyst removal    No other hospitalizations.    He is  and lives in [place].  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  No other personal history of cancer.    Father had pancreatic cancer.  Mother had lung cancer.  No other cancer in the family.        Past History    Past Medical History:   Diagnosis Date     Diabetes (H)      Hypertension     No family history on file.   Past Surgical History:   Procedure Laterality Date     IR MISCELLANEOUS PROCEDURE  10/18/2000     LAPAROSCOPIC ASSISTED COLECTOMY N/A 9/5/2023    Procedure: COLECTOMY, PARTIAL LAPAROSCOPIC, LAPAROSCOPIC LYSIS OF ADHESIONS;  Surgeon: Thor Fall DO;  Location: [facility] OR     RECTAL PROLAPSE REPAIR N/A 9/5/2023    Procedure: open sigmoidectomy,;  Surgeon: Thor Fall DO;  Location: [facility] OR     RESECT SMALL BOWEL WITH OSTOMY N/A 9/5/2023    Procedure: ostomy creation;  Surgeon: Thor Fall DO;  Location: [facility] OR    Social History     Socioeconomic History     Marital status:      Spouse name: Not on file     Number of children: Not on file     Years of education: Not on file     Highest education level: Not on file   Occupational History     Not on file   Tobacco Use     Smoking status: Never     Passive exposure: Past (Parents smoked)     Smokeless tobacco: Never   Substance and Sexual Activity     Alcohol use: Never     Drug use: Never     Sexual activity: Not on file   Other Topics Concern     Not on file   Social History Narrative     Not on file     Social Determinants of Health     Financial Resource Strain: Not on file   Food Insecurity: Not on file   Transportation Needs: Not on file   Physical  "Activity: Not on file   Stress: Not on file   Social Connections: Not on file   Interpersonal Safety: Not on file   Housing Stability: Not on file        Allergies    No Known Allergies    Review of Systems    Pertinent items are noted in HPI.      Physical Exam        4/15/2024    12:27 PM   Oncology Vitals   Height 172 cm   Weight 98.884 kg   BSA (m2) 2.17 m2   /72   Pulse 57   Temp 97.1  F (36.2  C)   Temp src Tympanic   SpO2 96 %   Pain Score 0 (None)       BP (!) 152/72 (BP Location: Left arm, Patient Position: Sitting, Cuff Size: Adult Large)   Pulse 57   Temp 97.1  F (36.2  C) (Tympanic)   Resp 20   Ht 1.715 m (5' 7.5\")   Wt 98.9 kg (218 lb)   SpO2 96%   BMI 33.64 kg/m      General Appearance:    Alert, cooperative, no distress, appears stated age   Head:    Normocephalic, without obvious abnormality, atraumatic   Eyes:    PERRL, conjunctiva/corneas clear, EOM's intact, fundi     benign, both eyes        Ears:    Normal TM's and external ear canals, both ears   Nose:   Nares normal, septum midline, mucosa normal, no drainage    or sinus tenderness   Throat:   Lips, mucosa, and tongue normal; teeth and gums normal   Neck:   Supple, symmetrical, trachea midline, no adenopathy;        thyroid:  No enlargement/tenderness/nodules; no carotid    bruit or JVD   Back:     Symmetric, no curvature, ROM normal, no CVA tenderness   Lungs:     Clear to auscultation bilaterally, respirations unlabored   Chest wall:    No tenderness or deformity   Heart:    Regular rate and rhythm, S1 and S2 normal, no murmur, rub   or gallop   Abdomen:     Soft, non-tender, bowel sounds active all four quadrants,     no masses, no organomegaly, left colostomy           Extremities:   Extremities normal, atraumatic, no cyanosis or edema   Pulses:   2+ and symmetric all extremities   Skin:   Skin color, texture, turgor normal, no rashes or lesions   Lymph nodes:   Cervical, supraclavicular, and axillary nodes normal "   Neurologic:   CNII-XII intact. Normal strength, sensation and reflexes       throughout       Lab Results    SIGMOID COLON, SUTURE BOWENS PROXIMAL MARGIN, LAPAROSCOPIC PARTIAL COLECTOMY/OPEN SIGMOIDECTOMY:  -MODERATELY DIFFERENTIATED ADENOCARCINOMA INVADING THROUGH MUSCULARIS PROPRIA INTO PERICOLIC  TISSUE  -FORTY-TWO LYMPH NODES NEGATIVE FOR METASTATIC CARCINOMA (0/42)  -MARGINS NEGATIVE  -DIVERTICULOSIS  -MISMATCH REPAIR IMMUNOHISTOCHEMICAL STAINS ARE PENDING ON BLOCK A14 AND WILL BE REPORTED IN AN  ADDENDUM  -SEE SYNOPTIC REPORT    Imaging Results    No results found.      Signed by: Everardo Champion MD      Again, thank you for allowing me to participate in the care of your patient.        Sincerely,        Everardo Champion MD

## 2024-04-15 NOTE — PROGRESS NOTES
[facility] Hematology and Oncology Consult Note    Patient: Alok Mcfarland  MRN: [id number]  Date of Service: 10/03/2023      Reason for Visit    Chief Complaint   Patient presents with    Oncology Clinic Visit     3 month follow up with labs related to Cancer of sigmoid colon; Microcytic anemia         Assessment/Plan    T3 N0 M0, stage II sigmoid colon cancer status post sigmoidectomy with diverting colostomy on September 5, 2023  Previous history of hairy cell leukemia in remission    Patient doing well with no concern for recurrence of colon cancer.  He is reassured.  Labs are pending today.  Will see him back in 3 months with recheck of the CEA level.    He will contact surgeons office for colonoscopy and takedown of colostomy.    Plan: As above    Measurable disease: None postoperatively    Current therapy: Observation          ECOG Performance    0 - Independent    Problem List    Patient Active Problem List   Diagnosis    H/O colectomy    Vitamin D deficiency    Obesity    Mixed hyperlipidemia    Hypomagnesemia    Psoriasis    Type 2 diabetes mellitus without complication (H)    Hypocalcemia    Hairy cell leukemia, in remission (H)    Essential hypertension    Current moderate episode of major depressive disorder, unspecified whether recurrent (H)    Atopic dermatitis    Cancer of sigmoid colon (H)     ______________________________________________________________________________    Staging History     Cancer Staging   No matching staging information was found for the patient.    History    Darin is here for reevaluation.  He was seen 3 months ago.  Feeling okay.  Colostomy is functioning fine.  No other abdominal pain.  No problems with his bowel movements.  ECOG status is 0.        October 2023:    Patient is a 72-year-old with past history of hairy cell leukemia status posttreatment over 20 years ago in remission, type 2 diabetes, hypertension and hyperlipidemia who is referred for evaluation of  colon cancer.  He presented with weight loss and diarrhea and had CT scan showing a sigmoid colon mass.  It was felt he would not tolerate colon prep and therefore was taken straight to surgery on September 5.  He had sigmoidectomy and diverting colostomy.  Was in the ER for evaluation of abnormal labs about 2 weeks ago.  After that has been doing really well.  No headaches or dizziness.  No shortness of breath or cough.  Appetite and weight improving.  No abdominal pain.  Colostomy functioning fine.  No new urinary symptoms.  No bleeding or rash.  ECOG status 0.    Past medical history as above.  Past surgical history includes cholecystectomy, tonsillectomy and cyst removal    No other hospitalizations.    He is  and lives in [place].  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  No other personal history of cancer.    Father had pancreatic cancer.  Mother had lung cancer.  No other cancer in the family.        Past History    Past Medical History:   Diagnosis Date    Diabetes (H)     Hypertension     No family history on file.   Past Surgical History:   Procedure Laterality Date    IR MISCELLANEOUS PROCEDURE  10/18/2000    LAPAROSCOPIC ASSISTED COLECTOMY N/A 9/5/2023    Procedure: COLECTOMY, PARTIAL LAPAROSCOPIC, LAPAROSCOPIC LYSIS OF ADHESIONS;  Surgeon: Thor Fall DO;  Location: [facility] OR    RECTAL PROLAPSE REPAIR N/A 9/5/2023    Procedure: open sigmoidectomy,;  Surgeon: Thor Fall DO;  Location: [facility] OR    RESECT SMALL BOWEL WITH OSTOMY N/A 9/5/2023    Procedure: ostomy creation;  Surgeon: Thor Fall DO;  Location: [facility] OR    Social History     Socioeconomic History    Marital status:      Spouse name: Not on file    Number of children: Not on file    Years of education: Not on file    Highest education level: Not on file   Occupational History    Not on file   Tobacco Use    Smoking status: Never      "Passive exposure: Past (Parents smoked)    Smokeless tobacco: Never   Substance and Sexual Activity    Alcohol use: Never    Drug use: Never    Sexual activity: Not on file   Other Topics Concern    Not on file   Social History Narrative    Not on file     Social Determinants of Health     Financial Resource Strain: Not on file   Food Insecurity: Not on file   Transportation Needs: Not on file   Physical Activity: Not on file   Stress: Not on file   Social Connections: Not on file   Interpersonal Safety: Not on file   Housing Stability: Not on file        Allergies    No Known Allergies    Review of Systems    Pertinent items are noted in HPI.      Physical Exam        4/15/2024    12:27 PM   Oncology Vitals   Height 172 cm   Weight 98.884 kg   BSA (m2) 2.17 m2   /72   Pulse 57   Temp 97.1  F (36.2  C)   Temp src Tympanic   SpO2 96 %   Pain Score 0 (None)       BP (!) 152/72 (BP Location: Left arm, Patient Position: Sitting, Cuff Size: Adult Large)   Pulse 57   Temp 97.1  F (36.2  C) (Tympanic)   Resp 20   Ht 1.715 m (5' 7.5\")   Wt 98.9 kg (218 lb)   SpO2 96%   BMI 33.64 kg/m      General Appearance:    Alert, cooperative, no distress, appears stated age   Head:    Normocephalic, without obvious abnormality, atraumatic   Eyes:    PERRL, conjunctiva/corneas clear, EOM's intact, fundi     benign, both eyes        Ears:    Normal TM's and external ear canals, both ears   Nose:   Nares normal, septum midline, mucosa normal, no drainage    or sinus tenderness   Throat:   Lips, mucosa, and tongue normal; teeth and gums normal   Neck:   Supple, symmetrical, trachea midline, no adenopathy;        thyroid:  No enlargement/tenderness/nodules; no carotid    bruit or JVD   Back:     Symmetric, no curvature, ROM normal, no CVA tenderness   Lungs:     Clear to auscultation bilaterally, respirations unlabored   Chest wall:    No tenderness or deformity   Heart:    Regular rate and rhythm, S1 and S2 normal, no murmur, " rub   or gallop   Abdomen:     Soft, non-tender, bowel sounds active all four quadrants,     no masses, no organomegaly, left colostomy           Extremities:   Extremities normal, atraumatic, no cyanosis or edema   Pulses:   2+ and symmetric all extremities   Skin:   Skin color, texture, turgor normal, no rashes or lesions   Lymph nodes:   Cervical, supraclavicular, and axillary nodes normal   Neurologic:   CNII-XII intact. Normal strength, sensation and reflexes       throughout       Lab Results    SIGMOID COLON, SUTURE BOWENS PROXIMAL MARGIN, LAPAROSCOPIC PARTIAL COLECTOMY/OPEN SIGMOIDECTOMY:  -MODERATELY DIFFERENTIATED ADENOCARCINOMA INVADING THROUGH MUSCULARIS PROPRIA INTO PERICOLIC  TISSUE  -FORTY-TWO LYMPH NODES NEGATIVE FOR METASTATIC CARCINOMA (0/42)  -MARGINS NEGATIVE  -DIVERTICULOSIS  -MISMATCH REPAIR IMMUNOHISTOCHEMICAL STAINS ARE PENDING ON BLOCK A14 AND WILL BE REPORTED IN AN  ADDENDUM  -SEE SYNOPTIC REPORT    Imaging Results    No results found.      Signed by: Everardo Champion MD

## 2024-04-15 NOTE — PROGRESS NOTES
[facility]: Cancer Care                                                                                          Situation: Chart reviewed by RN Care Coordinator.    Background: Darin was in clinic today for follow up regarding colon cancer.    Assessment: He is doing well with no concern for recurrence.    Plan/Recommendations: Follow up 3 months with recheck of CEA.      Signature:  Verena Dillard RN Care Coordinator  [facility]  Cancer [facility]

## 2024-04-15 NOTE — PROGRESS NOTES
"Oncology Rooming Note    April 15, 2024 12:51 PM   Alok Mcfarland is a 73 year old male who presents for:    Chief Complaint   Patient presents with    Oncology Clinic Visit     3 month follow up with labs related to Cancer of sigmoid colon; Microcytic anemia       Initial Vitals: BP (!) 152/72 (BP Location: Left arm, Patient Position: Sitting, Cuff Size: Adult Large)   Pulse 57   Temp 97.1  F (36.2  C) (Tympanic)   Resp 20   Ht 1.715 m (5' 7.5\")   Wt 98.9 kg (218 lb)   SpO2 96%   BMI 33.64 kg/m   Estimated body mass index is 33.64 kg/m  as calculated from the following:    Height as of this encounter: 1.715 m (5' 7.5\").    Weight as of this encounter: 98.9 kg (218 lb). Body surface area is 2.17 meters squared.  No Pain (0) Comment: Data Unavailable   No LMP for male patient.  Allergies reviewed: Yes  Medications reviewed: Yes    Medications: Medication refills not needed today.  Pharmacy name entered into [employer]: [id number] [place] [address] [facility]    Frailty Screening:   Is the patient here for a new oncology consult visit in cancer care? 2. No      Clinical concerns: 3 month follow up with labs related to Cancer of sigmoid colon; Microcytic anemia      MELINA BUSCH CMA            "

## 2024-04-16 LAB — CEA SERPL-MCNC: 1 NG/ML

## 2024-04-17 ENCOUNTER — TELEPHONE (OUTPATIENT)
Dept: ONCOLOGY | Facility: HOSPITAL | Age: 74
End: 2024-04-17
Payer: COMMERCIAL

## 2024-04-17 NOTE — TELEPHONE ENCOUNTER
I received a phone call today from Darin's wife, Kathy.  She is calling because Darin saw Dr. Champion in the clinic this week and had labs drawn.  He was put on ferrous gluconate back in January by Denice Guzman CNP for having a low ferritin.  Kathy is wondering if Darin needs to continue taking this?  I let her know that Dr. Champion is not in the clinic today.  She is okay getting a phone call back later this week.  She can be reached at [phone].    Grisel Trejo RN on 4/17/2024 at 8:46 AM

## 2024-04-19 ENCOUNTER — PATIENT OUTREACH (OUTPATIENT)
Dept: ONCOLOGY | Facility: HOSPITAL | Age: 74
End: 2024-04-19
Payer: COMMERCIAL

## 2024-04-19 DIAGNOSIS — D50.0 IRON DEFICIENCY ANEMIA DUE TO CHRONIC BLOOD LOSS: Primary | ICD-10-CM

## 2024-04-19 RX ORDER — FERROUS GLUCONATE 324(38)MG
324 TABLET ORAL
Qty: 30 TABLET | Refills: 11 | Status: SHIPPED | OUTPATIENT
Start: 2024-04-19

## 2024-04-19 NOTE — PROGRESS NOTES
"[facility]: Cancer Care                                                                                          I call patient's wife, Venus to report the following per Dr. Champion: \"continue iron, please add on cbc with diff and ferritin with next lab draw for us with next visit.\"    Venus verbalized understanding and appreciation of our conversation. Refill for iron was sent to local pharmacy. Lab orders place for future follow up.     Signature:  Verena Dillard RN Care Coordinator  [facility]  Cancer Care [facility]    "

## 2024-07-18 ENCOUNTER — ONCOLOGY VISIT (OUTPATIENT)
Dept: ONCOLOGY | Facility: HOSPITAL | Age: 74
End: 2024-07-18
Attending: NURSE PRACTITIONER
Payer: COMMERCIAL

## 2024-07-18 ENCOUNTER — LAB (OUTPATIENT)
Dept: INFUSION THERAPY | Facility: HOSPITAL | Age: 74
End: 2024-07-18
Attending: NURSE PRACTITIONER
Payer: COMMERCIAL

## 2024-07-18 ENCOUNTER — PATIENT OUTREACH (OUTPATIENT)
Dept: ONCOLOGY | Facility: HOSPITAL | Age: 74
End: 2024-07-18

## 2024-07-18 VITALS
WEIGHT: 223.1 LBS | TEMPERATURE: 98 F | HEART RATE: 63 BPM | BODY MASS INDEX: 33.81 KG/M2 | HEIGHT: 68 IN | RESPIRATION RATE: 18 BRPM | OXYGEN SATURATION: 95 % | SYSTOLIC BLOOD PRESSURE: 165 MMHG | DIASTOLIC BLOOD PRESSURE: 73 MMHG

## 2024-07-18 DIAGNOSIS — C18.7 CANCER OF SIGMOID COLON (H): ICD-10-CM

## 2024-07-18 DIAGNOSIS — D50.0 IRON DEFICIENCY ANEMIA DUE TO CHRONIC BLOOD LOSS: ICD-10-CM

## 2024-07-18 LAB
BASOPHILS # BLD AUTO: 0 10E3/UL (ref 0–0.2)
BASOPHILS NFR BLD AUTO: 1 %
CEA SERPL-MCNC: 1.1 NG/ML
EOSINOPHIL # BLD AUTO: 0.1 10E3/UL (ref 0–0.7)
EOSINOPHIL NFR BLD AUTO: 2 %
ERYTHROCYTE [DISTWIDTH] IN BLOOD BY AUTOMATED COUNT: 15.6 % (ref 10–15)
FERRITIN SERPL-MCNC: 70 NG/ML (ref 31–409)
HCT VFR BLD AUTO: 40.3 % (ref 40–53)
HGB BLD-MCNC: 13.6 G/DL (ref 13.3–17.7)
IMM GRANULOCYTES # BLD: 0 10E3/UL
IMM GRANULOCYTES NFR BLD: 1 %
LYMPHOCYTES # BLD AUTO: 1 10E3/UL (ref 0.8–5.3)
LYMPHOCYTES NFR BLD AUTO: 20 %
MCH RBC QN AUTO: 27.3 PG (ref 26.5–33)
MCHC RBC AUTO-ENTMCNC: 33.7 G/DL (ref 31.5–36.5)
MCV RBC AUTO: 81 FL (ref 78–100)
MONOCYTES # BLD AUTO: 0.3 10E3/UL (ref 0–1.3)
MONOCYTES NFR BLD AUTO: 6 %
NEUTROPHILS # BLD AUTO: 3.4 10E3/UL (ref 1.6–8.3)
NEUTROPHILS NFR BLD AUTO: 72 %
NRBC # BLD AUTO: 0 10E3/UL
NRBC BLD AUTO-RTO: 0 /100
PLATELET # BLD AUTO: 125 10E3/UL (ref 150–450)
RBC # BLD AUTO: 4.98 10E6/UL (ref 4.4–5.9)
WBC # BLD AUTO: 4.8 10E3/UL (ref 4–11)

## 2024-07-18 PROCEDURE — G0463 HOSPITAL OUTPT CLINIC VISIT: HCPCS | Performed by: NURSE PRACTITIONER

## 2024-07-18 PROCEDURE — 85041 AUTOMATED RBC COUNT: CPT

## 2024-07-18 PROCEDURE — 82728 ASSAY OF FERRITIN: CPT

## 2024-07-18 PROCEDURE — 99214 OFFICE O/P EST MOD 30 MIN: CPT | Performed by: NURSE PRACTITIONER

## 2024-07-18 PROCEDURE — G2211 COMPLEX E/M VISIT ADD ON: HCPCS | Performed by: NURSE PRACTITIONER

## 2024-07-18 PROCEDURE — 36415 COLL VENOUS BLD VENIPUNCTURE: CPT

## 2024-07-18 PROCEDURE — 82378 CARCINOEMBRYONIC ANTIGEN: CPT

## 2024-07-18 ASSESSMENT — PAIN SCALES - GENERAL: PAINLEVEL: NO PAIN (0)

## 2024-07-18 NOTE — PROGRESS NOTES
[facility]: Cancer Care                                                                                          Situation: Chart reviewed by RN Care Coordinator.    Background: Patient was seen in clinic today for follow-up regarding colon cancer.    Assessment: On observation.  CEA continues to be low.  He is doing well and reports no new symptoms.    Plan/Recommendations: He is to return to clinic in 3 months with a scan prior.      Signature:  Verena Dillard RN Care Coordinator  [facility]  Cancer [facility]

## 2024-07-18 NOTE — LETTER
7/18/2024      Alok Mcfarland  [address] [facility] [address]      Dear Colleague,    Thank you for referring your patient, Alok Mcfarland, to the [facility] [place]. Please see a copy of my visit note below.    [facility] Hematology and Oncology Progress Note    Patient: Alok Mcfarland  MRN: [id number]  Date of Service: Jul 18, 2024          Reason for Visit    Chief Complaint   Patient presents with     Oncology Clinic Visit     3 month return visit with labs related to Cancer of sigmoid colon.       Assessment and Plan     Cancer Staging   No matching staging information was found for the patient.    1.  Colon cancer, stage II: pT3 pN0  S/p sigmoidectomy with diverting  September 2023.  He is now on observation.  He will continue to be seen every 3 months for the first 2 years then every 6 months to complete 5 years. CEA continues to be low. Pending for today.  He is doing very well and notes no new symptoms.  He has a good appetite and is not losing weight.  He denies bloody or black stool.  No abdominal pain.  No signs or symptoms of recurrence.  Due for colonoscopy soon. We will do an annual CT scans for the first 3 years. Will follow-up in 3 months with CT prior.    2. New Thrombocytopenia  Platelet levels have been 160-200 prior to this.  Starting April 2024 his platelet count has begun to decrease.  It was 142 and is now 125 today.  He has a normal hemoglobin and white blood cell count.  Denies bleeding issues.  Notes he had COVID 1 month ago and recovered well.  No other recent illnesses.  He has not started any new medications.  Discussed many causes for thrombocytopenia including ITP from viral illness or possible relapse of leukemia.  Will recheck CBC in 4 to 6 weeks and have follow-up in 3 months.  If thrombocytopenia continues to persist/worsen will need further workup.    3. Microcytic anemia, resolved  Had iron deficiency from bleeding.  Improved from  surgery.  He is taking iron supplement daily.  Labs today show normal hemoglobin and MCV of 81. Ferritin pending today.    4. Hx of hairy cell leukemia, in remission   Treated 20+ years ago.     5. Colostomy  Doing well.  Notes he has diarrhea and constipation depending on what he eats.  He is working with the surgeon to figure out when to do the colonoscopy and then the takedown of the colostomy    ECOG Performance    0 - Independent    Distress Screening (within last 30 days)    No data recorded     Pain  Pain Score: No Pain (0)    Problem List    Patient Active Problem List   Diagnosis     H/O colectomy     Vitamin D deficiency     Obesity     Mixed hyperlipidemia     Hypomagnesemia     Psoriasis     Type 2 diabetes mellitus without complication (H)     Hypocalcemia     Hairy cell leukemia, in remission (H)     Essential hypertension     Current moderate episode of major depressive disorder, unspecified whether recurrent (H)     Atopic dermatitis     Cancer of sigmoid colon (H)        ______________________________________________________________________________    History of Present Illness    Oncologist: Dr. Houston    Diagnosis: Colon cancer, sigmoid.  Moderately differentiated adenocarcinoma.  T3 N0 M0.  Grade 2.  42 lymph nodes removed and all of them were negative.  Low probability of MSI-H    Remote history of hairy cell leukemia.  Patient is status post treatment over 20 years ago.  Has been in remission since.    Treatment:   -9/5/23: Sigmoidectomy and diverting colostomy.  Observation    Interim History:   Patient is here today for 3-month follow-up visit.  He is overall well and denies no new symptoms.  He has a good appetite and has been gaining weight.  He denies any headaches, vision changes, breathing changes, abdominal pain, or leg swelling.  He notes his bowel movements can be either diarrhea or constipation depending on what he eats. He denies any new bone or back pain.  Denies any blood in his  "stool.  Still has his colostomy.  Denies fevers, chills, or drenching night sweats.    Review of Systems    Pertinent items are noted in HPI.    Past History    Past Medical History:   Diagnosis Date     Diabetes (H)      Hypertension        PHYSICAL EXAM:  BP (!) 165/73   Pulse 63   Temp 98  F (36.7  C)   Resp 18   Ht 1.715 m (5' 7.5\")   Wt 101.2 kg (223 lb 1.6 oz)   SpO2 95%   BMI 34.43 kg/m    GENERAL: no acute distress. Cooperative in conversation. Here alone  RESP: lungs are clear bilaterally per auscultation. Regular respiratory rate. No wheezes or rhonchi.  CV: Regular, rate and rhythm. No murmurs.  ABD: Ostomy intact with brown stool.   MUSCULOSKELETAL: No lower extremity swelling.   NEURO: non focal. Alert and oriented x3.   PSYCH: within normal limits. No depression or anxiety.  SKIN: warm dry intact.  No petechiae or purpura lower extremities.  LYMPH: no cervical, supraclavicular, or axillary lymphadenopathy      Lab Results    Recent Results (from the past 168 hour(s))   CBC with platelets and differential   Result Value Ref Range    WBC Count 4.8 4.0 - 11.0 10e3/uL    RBC Count 4.98 4.40 - 5.90 10e6/uL    Hemoglobin 13.6 13.3 - 17.7 g/dL    Hematocrit 40.3 40.0 - 53.0 %    MCV 81 78 - 100 fL    MCH 27.3 26.5 - 33.0 pg    MCHC 33.7 31.5 - 36.5 g/dL    RDW 15.6 (H) 10.0 - 15.0 %    Platelet Count 125 (L) 150 - 450 10e3/uL    % Neutrophils 72 %    % Lymphocytes 20 %    % Monocytes 6 %    % Eosinophils 2 %    % Basophils 1 %    % Immature Granulocytes 1 %    NRBCs per 100 WBC 0 <1 /100    Absolute Neutrophils 3.4 1.6 - 8.3 10e3/uL    Absolute Lymphocytes 1.0 0.8 - 5.3 10e3/uL    Absolute Monocytes 0.3 0.0 - 1.3 10e3/uL    Absolute Eosinophils 0.1 0.0 - 0.7 10e3/uL    Absolute Basophils 0.0 0.0 - 0.2 10e3/uL    Absolute Immature Granulocytes 0.0 <=0.4 10e3/uL    Absolute NRBCs 0.0 10e3/uL     Imaging    No results found.    The longitudinal plan of care for the diagnosis(es)/condition(s) as documented " "were addressed during this visit. Due to the added complexity in care, I will continue to support Darin in the subsequent management and with ongoing continuity of care.    Signed by: VANDANA Milner CNP    I, VANDANA Milner CNP, saw this patient and agree with the findings and plan of care as documented in the note.      Items personally reviewed/procedural attestation: vitals, labs, and I was present for key portions of the visit and agree with Assessment and plan      Oncology Rooming Note    July 18, 2024 10:18 AM   Alok Mcfarland is a 73 year old male who presents for:    Chief Complaint   Patient presents with     Oncology Clinic Visit     3 month return visit with labs related to Cancer of sigmoid colon.     Initial Vitals: BP (!) 165/73   Pulse 63   Temp 98  F (36.7  C)   Resp 18   Ht 1.715 m (5' 7.5\")   Wt 101.2 kg (223 lb 1.6 oz)   SpO2 95%   BMI 34.43 kg/m   Estimated body mass index is 34.43 kg/m  as calculated from the following:    Height as of this encounter: 1.715 m (5' 7.5\").    Weight as of this encounter: 101.2 kg (223 lb 1.6 oz). Body surface area is 2.2 meters squared.  No Pain (0) Comment: Data Unavailable   No LMP for male patient.  Allergies reviewed: Yes  Medications reviewed: Yes    Medications: Medication refills not needed today.  Pharmacy name entered into [employer]: [id number] [place] [address] [place] [address]    Frailty Screening:   Is the patient here for a new oncology consult visit in cancer care? 2. No      Clinical concerns: None      Stacie Reveles LPN                 Again, thank you for allowing me to participate in the care of your patient.        Sincerely,        VANDANA Milner CNP  "

## 2024-07-18 NOTE — PROGRESS NOTES
[facility] Hematology and Oncology Progress Note    Patient: Alok Mcfarland  MRN: [id number]  Date of Service: Jul 18, 2024          Reason for Visit    Chief Complaint   Patient presents with    Oncology Clinic Visit     3 month return visit with labs related to Cancer of sigmoid colon.       Assessment and Plan     Cancer Staging   No matching staging information was found for the patient.    1.  Colon cancer, stage II: pT3 pN0  S/p sigmoidectomy with diverting  September 2023.  He is now on observation.  He will continue to be seen every 3 months for the first 2 years then every 6 months to complete 5 years. CEA continues to be low. Pending for today.  He is doing very well and notes no new symptoms.  He has a good appetite and is not losing weight.  He denies bloody or black stool.  No abdominal pain.  No signs or symptoms of recurrence.  Due for colonoscopy soon. We will do an annual CT scans for the first 3 years. Will follow-up in 3 months with CT prior.    2. New Thrombocytopenia  Platelet levels have been 160-200 prior to this.  Starting April 2024 his platelet count has begun to decrease.  It was 142 and is now 125 today.  He has a normal hemoglobin and white blood cell count.  Denies bleeding issues.  Notes he had COVID 1 month ago and recovered well.  No other recent illnesses.  He has not started any new medications.  Discussed many causes for thrombocytopenia including ITP from viral illness or possible relapse of leukemia.  Will recheck CBC in 4 to 6 weeks and have follow-up in 3 months.  If thrombocytopenia continues to persist/worsen will need further workup.    3. Microcytic anemia, resolved  Had iron deficiency from bleeding.  Improved from surgery.  He is taking iron supplement daily.  Labs today show normal hemoglobin and MCV of 81. Ferritin pending today.    4. Hx of hairy cell leukemia, in remission   Treated 20+ years ago.     5. Colostomy  Doing well.  Notes he has diarrhea and  constipation depending on what he eats.  He is working with the surgeon to figure out when to do the colonoscopy and then the takedown of the colostomy    ECOG Performance    0 - Independent    Distress Screening (within last 30 days)    No data recorded     Pain  Pain Score: No Pain (0)    Problem List    Patient Active Problem List   Diagnosis    H/O colectomy    Vitamin D deficiency    Obesity    Mixed hyperlipidemia    Hypomagnesemia    Psoriasis    Type 2 diabetes mellitus without complication (H)    Hypocalcemia    Hairy cell leukemia, in remission (H)    Essential hypertension    Current moderate episode of major depressive disorder, unspecified whether recurrent (H)    Atopic dermatitis    Cancer of sigmoid colon (H)        ______________________________________________________________________________    History of Present Illness    Oncologist: Dr. Houston    Diagnosis: Colon cancer, sigmoid.  Moderately differentiated adenocarcinoma.  T3 N0 M0.  Grade 2.  42 lymph nodes removed and all of them were negative.  Low probability of MSI-H    Remote history of hairy cell leukemia.  Patient is status post treatment over 20 years ago.  Has been in remission since.    Treatment:   -9/5/23: Sigmoidectomy and diverting colostomy.  Observation    Interim History:   Patient is here today for 3-month follow-up visit.  He is overall well and denies no new symptoms.  He has a good appetite and has been gaining weight.  He denies any headaches, vision changes, breathing changes, abdominal pain, or leg swelling.  He notes his bowel movements can be either diarrhea or constipation depending on what he eats. He denies any new bone or back pain.  Denies any blood in his stool.  Still has his colostomy.  Denies fevers, chills, or drenching night sweats.    Review of Systems    Pertinent items are noted in HPI.    Past History    Past Medical History:   Diagnosis Date    Diabetes (H)     Hypertension        PHYSICAL EXAM:  BP (!)  "165/73   Pulse 63   Temp 98  F (36.7  C)   Resp 18   Ht 1.715 m (5' 7.5\")   Wt 101.2 kg (223 lb 1.6 oz)   SpO2 95%   BMI 34.43 kg/m    GENERAL: no acute distress. Cooperative in conversation. Here alone  RESP: lungs are clear bilaterally per auscultation. Regular respiratory rate. No wheezes or rhonchi.  CV: Regular, rate and rhythm. No murmurs.  ABD: Ostomy intact with brown stool.   MUSCULOSKELETAL: No lower extremity swelling.   NEURO: non focal. Alert and oriented x3.   PSYCH: within normal limits. No depression or anxiety.  SKIN: warm dry intact.  No petechiae or purpura lower extremities.  LYMPH: no cervical, supraclavicular, or axillary lymphadenopathy      Lab Results    Recent Results (from the past 168 hour(s))   CBC with platelets and differential   Result Value Ref Range    WBC Count 4.8 4.0 - 11.0 10e3/uL    RBC Count 4.98 4.40 - 5.90 10e6/uL    Hemoglobin 13.6 13.3 - 17.7 g/dL    Hematocrit 40.3 40.0 - 53.0 %    MCV 81 78 - 100 fL    MCH 27.3 26.5 - 33.0 pg    MCHC 33.7 31.5 - 36.5 g/dL    RDW 15.6 (H) 10.0 - 15.0 %    Platelet Count 125 (L) 150 - 450 10e3/uL    % Neutrophils 72 %    % Lymphocytes 20 %    % Monocytes 6 %    % Eosinophils 2 %    % Basophils 1 %    % Immature Granulocytes 1 %    NRBCs per 100 WBC 0 <1 /100    Absolute Neutrophils 3.4 1.6 - 8.3 10e3/uL    Absolute Lymphocytes 1.0 0.8 - 5.3 10e3/uL    Absolute Monocytes 0.3 0.0 - 1.3 10e3/uL    Absolute Eosinophils 0.1 0.0 - 0.7 10e3/uL    Absolute Basophils 0.0 0.0 - 0.2 10e3/uL    Absolute Immature Granulocytes 0.0 <=0.4 10e3/uL    Absolute NRBCs 0.0 10e3/uL     Imaging    No results found.    The longitudinal plan of care for the diagnosis(es)/condition(s) as documented were addressed during this visit. Due to the added complexity in care, I will continue to support Darin in the subsequent management and with ongoing continuity of care.    Signed by: VANDANA Milner CNP    I, VANDANA Milner CNP, saw this " patient and agree with the findings and plan of care as documented in the note.      Items personally reviewed/procedural attestation: vitals, labs, and I was present for key portions of the visit and agree with Assessment and plan

## 2024-07-18 NOTE — PROGRESS NOTES
"Oncology Rooming Note    July 18, 2024 10:18 AM   Alok Mcfarland is a 73 year old male who presents for:    Chief Complaint   Patient presents with    Oncology Clinic Visit     3 month return visit with labs related to Cancer of sigmoid colon.     Initial Vitals: BP (!) 165/73   Pulse 63   Temp 98  F (36.7  C)   Resp 18   Ht 1.715 m (5' 7.5\")   Wt 101.2 kg (223 lb 1.6 oz)   SpO2 95%   BMI 34.43 kg/m   Estimated body mass index is 34.43 kg/m  as calculated from the following:    Height as of this encounter: 1.715 m (5' 7.5\").    Weight as of this encounter: 101.2 kg (223 lb 1.6 oz). Body surface area is 2.2 meters squared.  No Pain (0) Comment: Data Unavailable   No LMP for male patient.  Allergies reviewed: Yes  Medications reviewed: Yes    Medications: Medication refills not needed today.  Pharmacy name entered into [employer]: [id number] [place] [address]    Frailty Screening:   Is the patient here for a new oncology consult visit in cancer care? 2. No      Clinical concerns: None      Stacie Reveles LPN             "

## 2024-08-29 ENCOUNTER — LAB (OUTPATIENT)
Dept: INFUSION THERAPY | Facility: HOSPITAL | Age: 74
End: 2024-08-29
Attending: INTERNAL MEDICINE
Payer: COMMERCIAL

## 2024-08-29 ENCOUNTER — PATIENT OUTREACH (OUTPATIENT)
Dept: ONCOLOGY | Facility: HOSPITAL | Age: 74
End: 2024-08-29

## 2024-08-29 DIAGNOSIS — C18.7 CANCER OF SIGMOID COLON (H): ICD-10-CM

## 2024-08-29 LAB
BASOPHILS # BLD AUTO: 0 10E3/UL (ref 0–0.2)
BASOPHILS NFR BLD AUTO: 1 %
EOSINOPHIL # BLD AUTO: 0.1 10E3/UL (ref 0–0.7)
EOSINOPHIL NFR BLD AUTO: 2 %
ERYTHROCYTE [DISTWIDTH] IN BLOOD BY AUTOMATED COUNT: 14.1 % (ref 10–15)
HCT VFR BLD AUTO: 40.2 % (ref 40–53)
HGB BLD-MCNC: 13.6 G/DL (ref 13.3–17.7)
IMM GRANULOCYTES # BLD: 0 10E3/UL
IMM GRANULOCYTES NFR BLD: 0 %
LYMPHOCYTES # BLD AUTO: 1.2 10E3/UL (ref 0.8–5.3)
LYMPHOCYTES NFR BLD AUTO: 21 %
MCH RBC QN AUTO: 27.8 PG (ref 26.5–33)
MCHC RBC AUTO-ENTMCNC: 33.8 G/DL (ref 31.5–36.5)
MCV RBC AUTO: 82 FL (ref 78–100)
MONOCYTES # BLD AUTO: 0.4 10E3/UL (ref 0–1.3)
MONOCYTES NFR BLD AUTO: 6 %
NEUTROPHILS # BLD AUTO: 3.8 10E3/UL (ref 1.6–8.3)
NEUTROPHILS NFR BLD AUTO: 70 %
NRBC # BLD AUTO: 0 10E3/UL
NRBC BLD AUTO-RTO: 0 /100
PLATELET # BLD AUTO: 142 10E3/UL (ref 150–450)
RBC # BLD AUTO: 4.9 10E6/UL (ref 4.4–5.9)
WBC # BLD AUTO: 5.5 10E3/UL (ref 4–11)

## 2024-08-29 PROCEDURE — 36415 COLL VENOUS BLD VENIPUNCTURE: CPT

## 2024-08-29 PROCEDURE — 85049 AUTOMATED PLATELET COUNT: CPT

## 2024-08-29 NOTE — PROGRESS NOTES
"[facility]: Cancer Care                                                                                          I called Darin to go over his lab results.  Results were reviewed by Elisa NARANJO.  She indicates the following: \"his platelets are improving although they are still low. Labs otherwise looked great. No action is required at this point. Will continue to monitor it with his next labs on 10/29 along with the CT and with visit with Rosemarie on 11/1.\"    I spoke with Darin's spouse, Venus.  She verbalized understanding and great appreciation of our conversation.      Signature:  Verena Dillard  RN Care Coordinator  [facility]  Cancer [facility]    "

## 2024-10-29 ENCOUNTER — HOSPITAL ENCOUNTER (OUTPATIENT)
Dept: CT IMAGING | Facility: HOSPITAL | Age: 74
Discharge: HOME | End: 2024-10-29
Payer: COMMERCIAL

## 2024-10-29 ENCOUNTER — LAB (OUTPATIENT)
Dept: INFUSION THERAPY | Facility: HOSPITAL | Age: 74
End: 2024-10-29
Attending: NURSE PRACTITIONER
Payer: COMMERCIAL

## 2024-10-29 DIAGNOSIS — C18.7 CANCER OF SIGMOID COLON (H): ICD-10-CM

## 2024-10-29 LAB
BASOPHILS # BLD AUTO: 0 10E3/UL (ref 0–0.2)
BASOPHILS NFR BLD AUTO: 1 %
CEA SERPL-MCNC: 1.2 NG/ML
CREAT BLD-MCNC: 1.7 MG/DL (ref 0.7–1.3)
EGFRCR SERPLBLD CKD-EPI 2021: 42 ML/MIN/1.73M2
EOSINOPHIL # BLD AUTO: 0.1 10E3/UL (ref 0–0.7)
EOSINOPHIL NFR BLD AUTO: 2 %
ERYTHROCYTE [DISTWIDTH] IN BLOOD BY AUTOMATED COUNT: 13.6 % (ref 10–15)
HCT VFR BLD AUTO: 40.2 % (ref 40–53)
HGB BLD-MCNC: 13.2 G/DL (ref 13.3–17.7)
IMM GRANULOCYTES # BLD: 0 10E3/UL
IMM GRANULOCYTES NFR BLD: 1 %
LYMPHOCYTES # BLD AUTO: 1.2 10E3/UL (ref 0.8–5.3)
LYMPHOCYTES NFR BLD AUTO: 16 %
MCH RBC QN AUTO: 26.9 PG (ref 26.5–33)
MCHC RBC AUTO-ENTMCNC: 32.8 G/DL (ref 31.5–36.5)
MCV RBC AUTO: 82 FL (ref 78–100)
MONOCYTES # BLD AUTO: 0.5 10E3/UL (ref 0–1.3)
MONOCYTES NFR BLD AUTO: 7 %
NEUTROPHILS # BLD AUTO: 5.3 10E3/UL (ref 1.6–8.3)
NEUTROPHILS NFR BLD AUTO: 74 %
NRBC # BLD AUTO: 0 10E3/UL
NRBC BLD AUTO-RTO: 0 /100
PLATELET # BLD AUTO: 197 10E3/UL (ref 150–450)
RBC # BLD AUTO: 4.9 10E6/UL (ref 4.4–5.9)
WBC # BLD AUTO: 7.2 10E3/UL (ref 4–11)

## 2024-10-29 PROCEDURE — 82565 ASSAY OF CREATININE: CPT

## 2024-10-29 PROCEDURE — 74177 CT ABD & PELVIS W/CONTRAST: CPT

## 2024-10-29 PROCEDURE — 36415 COLL VENOUS BLD VENIPUNCTURE: CPT

## 2024-10-29 PROCEDURE — 82378 CARCINOEMBRYONIC ANTIGEN: CPT

## 2024-10-29 PROCEDURE — 250N000011 HC RX IP 250 OP 636

## 2024-10-29 PROCEDURE — 85025 COMPLETE CBC W/AUTO DIFF WBC: CPT

## 2024-10-29 RX ORDER — IOPAMIDOL 755 MG/ML
75 INJECTION, SOLUTION INTRAVASCULAR ONCE
Status: COMPLETED | OUTPATIENT
Start: 2024-10-29 | End: 2024-10-29

## 2024-10-29 RX ADMIN — IOPAMIDOL 75 ML: 755 INJECTION, SOLUTION INTRAVENOUS at 10:37

## 2024-11-01 ENCOUNTER — PATIENT OUTREACH (OUTPATIENT)
Dept: ONCOLOGY | Facility: HOSPITAL | Age: 74
End: 2024-11-01
Payer: COMMERCIAL

## 2024-11-01 ENCOUNTER — ONCOLOGY VISIT (OUTPATIENT)
Dept: ONCOLOGY | Facility: HOSPITAL | Age: 74
End: 2024-11-01
Attending: INTERNAL MEDICINE
Payer: COMMERCIAL

## 2024-11-01 VITALS
SYSTOLIC BLOOD PRESSURE: 137 MMHG | DIASTOLIC BLOOD PRESSURE: 63 MMHG | HEIGHT: 68 IN | TEMPERATURE: 97.3 F | RESPIRATION RATE: 18 BRPM | BODY MASS INDEX: 33.49 KG/M2 | HEART RATE: 62 BPM | OXYGEN SATURATION: 96 % | WEIGHT: 221 LBS

## 2024-11-01 DIAGNOSIS — C18.7 CANCER OF SIGMOID COLON (H): Primary | ICD-10-CM

## 2024-11-01 PROCEDURE — 99214 OFFICE O/P EST MOD 30 MIN: CPT | Performed by: INTERNAL MEDICINE

## 2024-11-01 PROCEDURE — G2211 COMPLEX E/M VISIT ADD ON: HCPCS | Performed by: INTERNAL MEDICINE

## 2024-11-01 PROCEDURE — G0463 HOSPITAL OUTPT CLINIC VISIT: HCPCS | Performed by: INTERNAL MEDICINE

## 2024-11-01 ASSESSMENT — PAIN SCALES - GENERAL: PAINLEVEL_OUTOF10: NO PAIN (0)

## 2024-11-01 NOTE — LETTER
11/1/2024      Alok Mcfarland  [address] [facility] [place] [address]      Dear Colleague,    Thank you for referring your patient, Alok Mcfarland, to the [facility] CANCER [facility]. Please see a copy of my visit note below.    [facility] Hematology and Oncology Progress Note    Patient: Alok Mcfarland  MRN: [id number]  Date of Service: Nov 1, 2024        Assessment and Plan:    1.  Colon cancer, stage II: He had a CT scan on October 29.  I personally reviewed the images and shared them with Darin.  The report discusses several new small hepatic low-attenuation lesions.  I find these difficult to observe.  As such we will continue to follow with CT scans instead of proceeding right to an MRI at this point.  If there is any evidence of new or enlarging lesions at that time then we will get an MRI.  Otherwise there is no evidence of recurrent disease.  Clinically there is no evidence of recurrence.  Labs from yesterday are reviewed and show a low and stable CEA at 1.2.  CEA looks good with a white count of 7.2, hemoglobin 13.2 and platelets 197,000.  Will see him back in 6 months with imaging.    Medical decision Making:  Today's visit was centered around a cancer diagnosis in the setting of additional medical comorbidities. Complex medical decision making was required. The risk of additional morbidity without further treatment is high.     ECOG Performance  0    Diagnosis:    1.  Adenocarcinoma of the colon: Stage II.  Diagnosed September 2023.  Grade 2.  T3 N0 M0.  42 lymph nodes negative.  MSI low.    2.  Hairy cell leukemia: Diagnosed and treated over 20 years ago.    Treatment:    Sigmoidectomy and diverting colostomy September 5, 2023.    Interim History:    Darin returns today for follow-up visit.  Overall he is feeling okay.  No acute complaints.  No abdominal pain or change in bowel or bladder habits.  No unintentional weight loss.    Review of Systems:    As above in the history.  "    Review of Systems otherwise Negative for:  General: chills, fever or night sweats  Psychological: anxiety or depression  Ophthalmic: blurry vision, double vision or loss of vision, vision change  ENT: epistaxis, oral lesions, hearing changes  Hematological and Lymphatic: bleeding, bruising, jaundice, swollen lymph nodes  Endocrine: hot flashes, unexpected weight changes  Respiratory: cough, hemoptysis, orthopnea or shortness of breath/STOVALL  Cardiovascular: chest pain, edema, palpitations or PND  Gastrointestinal: abdominal pain, blood in stools, change in bowel habits, constipation, diarrhea or nausea/vomiting  Genito-Urinary: change in urinary stream, incontinence, frequency/urgency  Musculoskeletal: joint pain, stiffness, swelling, muscle pain  Neurological: dizziness, headaches, numbness/tingling  Dermatological: lumps and rash    Past History:    Past Medical History:   Diagnosis Date     Diabetes (H)      Hypertension      Physical Exam:    /63 (BP Location: Right arm, Patient Position: Sitting, Cuff Size: Adult Large)   Pulse 62   Temp 97.3  F (36.3  C) (Tympanic)   Resp 18   Ht 1.715 m (5' 7.5\")   Wt 100.2 kg (221 lb)   SpO2 96%   BMI 34.10 kg/m      General: patient appears stated age of 73 year old. Nontoxic and in no distress.   HEENT: Head: atraumatic, normocephalic. Sclerae anicteric.  Chest:  Normal respiratory effort  Cardiac:  No edema.   Abdomen: abdomen is non-distended  Extremities: normal tone and muscle bulk.  Skin: no lesions or rash on visible skin. Warm and dry.   CNS: alert and oriented. Grossly non-focal.   Psychiatric: normal mood and affect.     Lab Results:    No results found for this or any previous visit (from the past week).    Imaging:    CT Chest/Abdomen/Pelvis w Contrast    Result Date: 10/30/2024  EXAM: CT CHEST/ABDOMEN/PELVIS W CONTRAST LOCATION: [facility] DATE: 10/29/2024 INDICATION:  Cancer of sigmoid colon (H);  Colon cancer, stage II " on observation new thrombocytopenia,, remote history of pericecal leukemia. COMPARISON: 8/29/2023 TECHNIQUE: CT scan of the chest, abdomen, and pelvis was performed following injection of IV contrast. Multiplanar reformats were obtained. Dose reduction techniques were used. CONTRAST: 75ml isovue 370 FINDINGS: LUNGS AND PLEURA: Stable benign 2 mm right lower lobe pulmonary nodule, image 189 series 4 1 mm right lower lobe pulmonary nodule, image 173, not imaged previously. Tiny calcified right lower lobe granuloma also noted. MEDIASTINUM/AXILLAE: Small hiatal hernia. No significant mediastinal or hilar adenopathy. CORONARY ARTERY CALCIFICATION: Mild. HEPATOBILIARY: Cholecystectomy. New mild hepatic steatosis. There are several small new hepatic low-attenuation lesions, nonspecific. Posterior left hepatic lobe 9.5 mm observation, image 135, and anterior inferior right hepatic lobe 1.3 mm observation, image 167. Both low-attenuation lesions are subcapsular in location. PANCREAS: Unremarkable SPLEEN: Mild splenomegaly reaching 14.5 cm in AP dimension, unchanged. ADRENAL GLANDS: Unremarkable KIDNEYS/BLADDER: No hydronephrosis. Bladder is thick-walled but not well-distended, likely related to chronic relative outlet obstruction. BOWEL: Left lower quadrant ostomy with pelvic Novak's pouch status post partial colectomy. No small bowel obstruction. LYMPH NODES: No significant retroperitoneal adenopathy VASCULATURE: No abdominal aortic aneurysm. Patent portal vein. PELVIC ORGANS: Mild prostatic hypertrophy. No free fluid. Bilateral scrotal hydroceles. MUSCULOSKELETAL: No suspicious lytic or blastic lesions.     IMPRESSION: 1.  New subcapsular low-attenuation hepatic lesions are nonspecific but could represent focal fatty deposition. Dedicated hepatic MRI with and without IV contrast recommended to confirm benignity. 2.  Stable splenomegaly. 3.  Tiny pulmonary nodules are benign or likely benign. 4.  Status post partial  "colectomy, without other evidence of metastatic disease in the chest abdomen, or pelvis.       Signed by: Art Houston MD      Oncology Rooming Note    November 1, 2024 9:24 AM   Alok Mcfarland is a 73 year old male who presents for:    Chief Complaint   Patient presents with     Oncology Clinic Visit     3 month follow up with prior CT review     Initial Vitals: /63 (BP Location: Right arm, Patient Position: Sitting, Cuff Size: Adult Large)   Pulse 62   Temp 97.3  F (36.3  C) (Tympanic)   Resp 18   Ht 1.715 m (5' 7.5\")   Wt 100.2 kg (221 lb)   SpO2 96%   BMI 34.10 kg/m   Estimated body mass index is 34.1 kg/m  as calculated from the following:    Height as of this encounter: 1.715 m (5' 7.5\").    Weight as of this encounter: 100.2 kg (221 lb). Body surface area is 2.18 meters squared.  No Pain (0) Comment: Data Unavailable   No LMP for male patient.  Allergies reviewed: Yes  Medications reviewed: Yes    Medications: Medication refills not needed today.  Pharmacy name entered into [employer]: [facility][id number] [address]    Frailty Screening:   Is the patient here for a new oncology consult visit in cancer care? 2. No      Clinical concerns:       MELINA BUSCH CMA                Again, thank you for allowing me to participate in the care of your patient.        Sincerely,        Art Houston MD  " hard copy, drawn during this pregnancy

## 2024-11-01 NOTE — PROGRESS NOTES
[facility]: Cancer Care                                                                                          Situation: Chart reviewed by RN Care Coordinator.    Background: Patient was seen in clinic today for follow-up regarding colon cancer.     Assessment: On observation. CEA continues to be low. Doing well, no new symptoms.     Plan/Recommendations: Follow up in 3 months with CT scan prior.       Signature:  Verena Dillard  RN Care Coordinator  [facility]

## 2024-11-01 NOTE — PROGRESS NOTES
[facility] Hematology and Oncology Progress Note    Patient: Alok Mcfarland  MRN: [id number]  Date of Service: Nov 1, 2024        Assessment and Plan:    1.  Colon cancer, stage II: He had a CT scan on October 29.  I personally reviewed the images and shared them with Darin.  The report discusses several new small hepatic low-attenuation lesions.  I find these difficult to observe.  As such we will continue to follow with CT scans instead of proceeding right to an MRI at this point.  If there is any evidence of new or enlarging lesions at that time then we will get an MRI.  Otherwise there is no evidence of recurrent disease.  Clinically there is no evidence of recurrence.  Labs from yesterday are reviewed and show a low and stable CEA at 1.2.  CEA looks good with a white count of 7.2, hemoglobin 13.2 and platelets 197,000.  Will see him back in 6 months with imaging.    Medical decision Making:  Today's visit was centered around a cancer diagnosis in the setting of additional medical comorbidities. Complex medical decision making was required. The risk of additional morbidity without further treatment is high.     ECOG Performance  0    Diagnosis:    1.  Adenocarcinoma of the colon: Stage II.  Diagnosed September 2023.  Grade 2.  T3 N0 M0.  42 lymph nodes negative.  MSI low.    2.  Hairy cell leukemia: Diagnosed and treated over 20 years ago.    Treatment:    Sigmoidectomy and diverting colostomy September 5, 2023.    Interim History:    Darin returns today for follow-up visit.  Overall he is feeling okay.  No acute complaints.  No abdominal pain or change in bowel or bladder habits.  No unintentional weight loss.    Review of Systems:    As above in the history.     Review of Systems otherwise Negative for:  General: chills, fever or night sweats  Psychological: anxiety or depression  Ophthalmic: blurry vision, double vision or loss of vision, vision change  ENT: epistaxis, oral lesions, hearing  "changes  Hematological and Lymphatic: bleeding, bruising, jaundice, swollen lymph nodes  Endocrine: hot flashes, unexpected weight changes  Respiratory: cough, hemoptysis, orthopnea or shortness of breath/STOVALL  Cardiovascular: chest pain, edema, palpitations or PND  Gastrointestinal: abdominal pain, blood in stools, change in bowel habits, constipation, diarrhea or nausea/vomiting  Genito-Urinary: change in urinary stream, incontinence, frequency/urgency  Musculoskeletal: joint pain, stiffness, swelling, muscle pain  Neurological: dizziness, headaches, numbness/tingling  Dermatological: lumps and rash    Past History:    Past Medical History:   Diagnosis Date    Diabetes (H)     Hypertension      Physical Exam:    /63 (BP Location: Right arm, Patient Position: Sitting, Cuff Size: Adult Large)   Pulse 62   Temp 97.3  F (36.3  C) (Tympanic)   Resp 18   Ht 1.715 m (5' 7.5\")   Wt 100.2 kg (221 lb)   SpO2 96%   BMI 34.10 kg/m      General: patient appears stated age of 73 year old. Nontoxic and in no distress.   HEENT: Head: atraumatic, normocephalic. Sclerae anicteric.  Chest:  Normal respiratory effort  Cardiac:  No edema.   Abdomen: abdomen is non-distended  Extremities: normal tone and muscle bulk.  Skin: no lesions or rash on visible skin. Warm and dry.   CNS: alert and oriented. Grossly non-focal.   Psychiatric: normal mood and affect.     Lab Results:    No results found for this or any previous visit (from the past week).    Imaging:    CT Chest/Abdomen/Pelvis w Contrast    Result Date: 10/30/2024  EXAM: CT CHEST/ABDOMEN/PELVIS W CONTRAST LOCATION: [facility] DATE: 10/29/2024 INDICATION:  Cancer of sigmoid colon (H);  Colon cancer, stage II on observation new thrombocytopenia,, remote history of pericecal leukemia. COMPARISON: 8/29/2023 TECHNIQUE: CT scan of the chest, abdomen, and pelvis was performed following injection of IV contrast. Multiplanar reformats were obtained. " Dose reduction techniques were used. CONTRAST: 75ml isovue 370 FINDINGS: LUNGS AND PLEURA: Stable benign 2 mm right lower lobe pulmonary nodule, image 189 series 4 1 mm right lower lobe pulmonary nodule, image 173, not imaged previously. Tiny calcified right lower lobe granuloma also noted. MEDIASTINUM/AXILLAE: Small hiatal hernia. No significant mediastinal or hilar adenopathy. CORONARY ARTERY CALCIFICATION: Mild. HEPATOBILIARY: Cholecystectomy. New mild hepatic steatosis. There are several small new hepatic low-attenuation lesions, nonspecific. Posterior left hepatic lobe 9.5 mm observation, image 135, and anterior inferior right hepatic lobe 1.3 mm observation, image 167. Both low-attenuation lesions are subcapsular in location. PANCREAS: Unremarkable SPLEEN: Mild splenomegaly reaching 14.5 cm in AP dimension, unchanged. ADRENAL GLANDS: Unremarkable KIDNEYS/BLADDER: No hydronephrosis. Bladder is thick-walled but not well-distended, likely related to chronic relative outlet obstruction. BOWEL: Left lower quadrant ostomy with pelvic Novak's pouch status post partial colectomy. No small bowel obstruction. LYMPH NODES: No significant retroperitoneal adenopathy VASCULATURE: No abdominal aortic aneurysm. Patent portal vein. PELVIC ORGANS: Mild prostatic hypertrophy. No free fluid. Bilateral scrotal hydroceles. MUSCULOSKELETAL: No suspicious lytic or blastic lesions.     IMPRESSION: 1.  New subcapsular low-attenuation hepatic lesions are nonspecific but could represent focal fatty deposition. Dedicated hepatic MRI with and without IV contrast recommended to confirm benignity. 2.  Stable splenomegaly. 3.  Tiny pulmonary nodules are benign or likely benign. 4.  Status post partial colectomy, without other evidence of metastatic disease in the chest abdomen, or pelvis.       Signed by: Art Houston MD

## 2024-11-01 NOTE — PROGRESS NOTES
"Oncology Rooming Note    November 1, 2024 9:24 AM   Alok Mcfarland is a 73 year old male who presents for:    Chief Complaint   Patient presents with    Oncology Clinic Visit     3 month follow up with prior CT review     Initial Vitals: /63 (BP Location: Right arm, Patient Position: Sitting, Cuff Size: Adult Large)   Pulse 62   Temp 97.3  F (36.3  C) (Tympanic)   Resp 18   Ht 1.715 m (5' 7.5\")   Wt 100.2 kg (221 lb)   SpO2 96%   BMI 34.10 kg/m   Estimated body mass index is 34.1 kg/m  as calculated from the following:    Height as of this encounter: 1.715 m (5' 7.5\").    Weight as of this encounter: 100.2 kg (221 lb). Body surface area is 2.18 meters squared.  No Pain (0) Comment: Data Unavailable   No LMP for male patient.  Allergies reviewed: Yes  Medications reviewed: Yes    Medications: Medication refills not needed today.  Pharmacy name entered into [employer]: [facility] #[id number] [place], [place] - [address] [facility]    Frailty Screening:   Is the patient here for a new oncology consult visit in cancer care? 2. No      Clinical concerns:       MELINA BUSCH CMA              "

## 2024-11-20 ENCOUNTER — LAB REQUISITION (OUTPATIENT)
Dept: LAB | Facility: CLINIC | Age: 74
End: 2024-11-20

## 2024-11-20 DIAGNOSIS — E78.2 MIXED HYPERLIPIDEMIA: ICD-10-CM

## 2024-11-20 DIAGNOSIS — E11.9 TYPE 2 DIABETES MELLITUS WITHOUT COMPLICATIONS (H): ICD-10-CM

## 2024-11-20 LAB
ALBUMIN SERPL BCG-MCNC: 4 G/DL (ref 3.5–5.2)
ALP SERPL-CCNC: 93 U/L (ref 40–150)
ALT SERPL W P-5'-P-CCNC: 17 U/L (ref 0–70)
ANION GAP SERPL CALCULATED.3IONS-SCNC: 10 MMOL/L (ref 7–15)
AST SERPL W P-5'-P-CCNC: 16 U/L (ref 0–45)
BILIRUB SERPL-MCNC: 0.5 MG/DL
BUN SERPL-MCNC: 21.5 MG/DL (ref 8–23)
CALCIUM SERPL-MCNC: 9.1 MG/DL (ref 8.8–10.4)
CHLORIDE SERPL-SCNC: 100 MMOL/L (ref 98–107)
CHOLEST SERPL-MCNC: 148 MG/DL
CREAT SERPL-MCNC: 1.45 MG/DL (ref 0.67–1.17)
CREAT UR-MCNC: 164 MG/DL
EGFRCR SERPLBLD CKD-EPI 2021: 51 ML/MIN/1.73M2
FASTING STATUS PATIENT QL REPORTED: ABNORMAL
FASTING STATUS PATIENT QL REPORTED: ABNORMAL
GLUCOSE SERPL-MCNC: 265 MG/DL (ref 70–99)
HCO3 SERPL-SCNC: 25 MMOL/L (ref 22–29)
HDLC SERPL-MCNC: 33 MG/DL
LDLC SERPL CALC-MCNC: 65 MG/DL
MICROALBUMIN UR-MCNC: 14.8 MG/L
MICROALBUMIN/CREAT UR: 9.02 MG/G CR (ref 0–17)
NONHDLC SERPL-MCNC: 115 MG/DL
POTASSIUM SERPL-SCNC: 4.4 MMOL/L (ref 3.4–5.3)
PROT SERPL-MCNC: 6.9 G/DL (ref 6.4–8.3)
SODIUM SERPL-SCNC: 135 MMOL/L (ref 135–145)
TRIGL SERPL-MCNC: 248 MG/DL

## 2024-11-20 PROCEDURE — 82570 ASSAY OF URINE CREATININE: CPT | Performed by: FAMILY MEDICINE

## 2024-11-20 PROCEDURE — 82040 ASSAY OF SERUM ALBUMIN: CPT | Performed by: FAMILY MEDICINE

## 2024-11-20 PROCEDURE — 82043 UR ALBUMIN QUANTITATIVE: CPT | Performed by: FAMILY MEDICINE

## 2024-11-20 PROCEDURE — 84155 ASSAY OF PROTEIN SERUM: CPT | Performed by: FAMILY MEDICINE

## 2024-11-20 PROCEDURE — 82465 ASSAY BLD/SERUM CHOLESTEROL: CPT | Performed by: FAMILY MEDICINE

## 2025-04-30 ENCOUNTER — HOSPITAL ENCOUNTER (OUTPATIENT)
Dept: CT IMAGING | Facility: HOSPITAL | Age: 75
Discharge: HOME | End: 2025-04-30
Attending: INTERNAL MEDICINE
Payer: COMMERCIAL

## 2025-04-30 DIAGNOSIS — C18.7 CANCER OF SIGMOID COLON (H): ICD-10-CM

## 2025-04-30 LAB
CREAT BLD-MCNC: 1.6 MG/DL (ref 0.7–1.2)
EGFRCR SERPLBLD CKD-EPI 2021: 45 ML/MIN/1.73M2

## 2025-04-30 PROCEDURE — 250N000011 HC RX IP 250 OP 636: Performed by: INTERNAL MEDICINE

## 2025-04-30 PROCEDURE — 71260 CT THORAX DX C+: CPT

## 2025-04-30 PROCEDURE — 82565 ASSAY OF CREATININE: CPT

## 2025-04-30 RX ORDER — IOPAMIDOL 755 MG/ML
75 INJECTION, SOLUTION INTRAVASCULAR ONCE
Status: COMPLETED | OUTPATIENT
Start: 2025-04-30 | End: 2025-04-30

## 2025-04-30 RX ADMIN — IOPAMIDOL 75 ML: 755 INJECTION, SOLUTION INTRAVENOUS at 09:59

## 2025-05-07 ENCOUNTER — ONCOLOGY VISIT (OUTPATIENT)
Dept: ONCOLOGY | Facility: HOSPITAL | Age: 75
End: 2025-05-07
Attending: INTERNAL MEDICINE
Payer: COMMERCIAL

## 2025-05-07 ENCOUNTER — LAB (OUTPATIENT)
Dept: INFUSION THERAPY | Facility: HOSPITAL | Age: 75
End: 2025-05-07
Attending: INTERNAL MEDICINE
Payer: COMMERCIAL

## 2025-05-07 VITALS
HEIGHT: 68 IN | RESPIRATION RATE: 18 BRPM | BODY MASS INDEX: 33.07 KG/M2 | TEMPERATURE: 97.5 F | OXYGEN SATURATION: 94 % | HEART RATE: 83 BPM | WEIGHT: 218.2 LBS | SYSTOLIC BLOOD PRESSURE: 132 MMHG | DIASTOLIC BLOOD PRESSURE: 75 MMHG

## 2025-05-07 DIAGNOSIS — N18.32 CHRONIC RENAL IMPAIRMENT, STAGE 3B (H): ICD-10-CM

## 2025-05-07 DIAGNOSIS — C18.7 CANCER OF SIGMOID COLON (H): ICD-10-CM

## 2025-05-07 DIAGNOSIS — C18.7 CANCER OF SIGMOID COLON (H): Primary | ICD-10-CM

## 2025-05-07 LAB
ALBUMIN SERPL BCG-MCNC: 4.1 G/DL (ref 3.5–5.2)
ALP SERPL-CCNC: 90 U/L (ref 40–150)
ALT SERPL W P-5'-P-CCNC: 20 U/L (ref 0–70)
ANION GAP SERPL CALCULATED.3IONS-SCNC: 7 MMOL/L (ref 7–15)
AST SERPL W P-5'-P-CCNC: 15 U/L (ref 0–45)
BASOPHILS # BLD AUTO: 0 10E3/UL (ref 0–0.2)
BASOPHILS NFR BLD AUTO: 1 %
BILIRUB SERPL-MCNC: 0.7 MG/DL
BUN SERPL-MCNC: 26.6 MG/DL (ref 8–23)
CALCIUM SERPL-MCNC: 8.5 MG/DL (ref 8.8–10.4)
CHLORIDE SERPL-SCNC: 98 MMOL/L (ref 98–107)
CREAT SERPL-MCNC: 1.93 MG/DL (ref 0.67–1.17)
EGFRCR SERPLBLD CKD-EPI 2021: 36 ML/MIN/1.73M2
EOSINOPHIL # BLD AUTO: 0.1 10E3/UL (ref 0–0.7)
EOSINOPHIL NFR BLD AUTO: 2 %
ERYTHROCYTE [DISTWIDTH] IN BLOOD BY AUTOMATED COUNT: 14.6 % (ref 10–15)
GLUCOSE SERPL-MCNC: 211 MG/DL (ref 70–99)
HCO3 SERPL-SCNC: 28 MMOL/L (ref 22–29)
HCT VFR BLD AUTO: 41.3 % (ref 40–53)
HGB BLD-MCNC: 14.2 G/DL (ref 13.3–17.7)
IMM GRANULOCYTES # BLD: 0 10E3/UL
IMM GRANULOCYTES NFR BLD: 0 %
LYMPHOCYTES # BLD AUTO: 1.2 10E3/UL (ref 0.8–5.3)
LYMPHOCYTES NFR BLD AUTO: 21 %
MCH RBC QN AUTO: 27.6 PG (ref 26.5–33)
MCHC RBC AUTO-ENTMCNC: 34.4 G/DL (ref 31.5–36.5)
MCV RBC AUTO: 80 FL (ref 78–100)
MONOCYTES # BLD AUTO: 0.4 10E3/UL (ref 0–1.3)
MONOCYTES NFR BLD AUTO: 8 %
NEUTROPHILS # BLD AUTO: 3.8 10E3/UL (ref 1.6–8.3)
NEUTROPHILS NFR BLD AUTO: 68 %
NRBC # BLD AUTO: 0 10E3/UL
NRBC BLD AUTO-RTO: 0 /100
PLATELET # BLD AUTO: 142 10E3/UL (ref 150–450)
POTASSIUM SERPL-SCNC: 4.8 MMOL/L (ref 3.4–5.3)
PROT SERPL-MCNC: 6.9 G/DL (ref 6.4–8.3)
RBC # BLD AUTO: 5.14 10E6/UL (ref 4.4–5.9)
SODIUM SERPL-SCNC: 133 MMOL/L (ref 135–145)
WBC # BLD AUTO: 5.6 10E3/UL (ref 4–11)

## 2025-05-07 PROCEDURE — 85025 COMPLETE CBC W/AUTO DIFF WBC: CPT

## 2025-05-07 PROCEDURE — G0463 HOSPITAL OUTPT CLINIC VISIT: HCPCS | Performed by: INTERNAL MEDICINE

## 2025-05-07 PROCEDURE — G2211 COMPLEX E/M VISIT ADD ON: HCPCS | Performed by: INTERNAL MEDICINE

## 2025-05-07 PROCEDURE — 82247 BILIRUBIN TOTAL: CPT

## 2025-05-07 PROCEDURE — 36415 COLL VENOUS BLD VENIPUNCTURE: CPT

## 2025-05-07 PROCEDURE — 99214 OFFICE O/P EST MOD 30 MIN: CPT | Performed by: INTERNAL MEDICINE

## 2025-05-07 ASSESSMENT — PAIN SCALES - GENERAL: PAINLEVEL_OUTOF10: NO PAIN (0)

## 2025-05-07 NOTE — LETTER
5/7/2025      Alok Mcfarland  [address] [facility] [place] [address]      Dear Colleague,    Thank you for referring your patient, Alok Mcfarland, to the [facility] CANCER [facility]. Please see a copy of my visit note below.    [facility] Hematology and Oncology Progress Note    Patient: Alok Mcfarland  MRN: [id number]  Date of Service: May 7, 2025        Assessment and Plan:    1.  Colon cancer, stage II: He has CT scan performed in April 30.  I personally viewed the images and shared them with Darin.  Overall there is no evidence of recurrent colon cancer.  Clinically he is doing well.  Will continue to see him every 6 months for now with imaging and labs.  He is now just over a year and a half out from his definitive surgery.  Still planning on having ostomy reversal but it has not been done yet.    2.  Worsening kidney function: His CMP from today is reviewed and shows a sodium of 133, creatinine 1.9 and GFR 36.  Fully progressive over the past year.  I do not see any medications that typically contribute to this.  He notes that his ostomy output sometimes is liquidy but other times has very hard stool.  I asked him to increase his fluid intake as able.  Should probably have his numbers checked in 4 to 6 weeks by primary care.    ECOG Performance  0    Diagnosis:    1.  Adenocarcinoma of the colon: Stage II.  Diagnosed September 2023.  Grade 2.  T3 N0 M0.  42 lymph nodes negative.  MSI low.    2.  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    Treatment:    Sigmoidectomy and diverting colostomy September 5, 2023.    Interim History:    Darin returns today for follow-up visit.  Overall he is doing okay.  Feeling well.  Energy and appetite are okay.  No abdominal pain.  No change in his bowel habits.  Ostomy is functioning normally.    Review of Systems:    As above in the history.     Review of Systems otherwise Negative for:  General: chills, fever or night  "sweats  Psychological: anxiety or depression  Ophthalmic: blurry vision, double vision or loss of vision, vision change  ENT: epistaxis, oral lesions, hearing changes  Hematological and Lymphatic: bleeding, bruising, jaundice, swollen lymph nodes  Endocrine: hot flashes, unexpected weight changes  Respiratory: cough, hemoptysis, orthopnea or shortness of breath/STOVALL  Cardiovascular: chest pain, edema, palpitations or PND  Gastrointestinal: abdominal pain, blood in stools, change in bowel habits, constipation, diarrhea or nausea/vomiting  Genito-Urinary: change in urinary stream, incontinence, frequency/urgency  Musculoskeletal: joint pain, stiffness, swelling, muscle pain  Neurological: dizziness, headaches, numbness/tingling  Dermatological: lumps and rash    Past History:    Past Medical History:   Diagnosis Date     Diabetes (H)      Hypertension      Physical Exam:    /75   Pulse 83   Temp 97.5  F (36.4  C)   Resp 18   Ht 1.715 m (5' 7.5\")   Wt 99 kg (218 lb 3.2 oz)   SpO2 94%   BMI 33.67 kg/m      General: patient appears stated age of 73 year old. Nontoxic and in no distress.   HEENT: Head: atraumatic, normocephalic. Sclerae anicteric.  Chest:  Normal respiratory effort  Cardiac:  No edema.   Abdomen: abdomen is non-distended  Extremities: normal tone and muscle bulk.  Skin: no lesions or rash on visible skin. Warm and dry.   CNS: alert and oriented. Grossly non-focal.   Psychiatric: normal mood and affect.     Lab Results:    Recent Results (from the past week)   Comprehensive metabolic panel   Result Value Ref Range    Sodium 133 (L) 135 - 145 mmol/L    Potassium 4.8 3.4 - 5.3 mmol/L    Carbon Dioxide (CO2) 28 22 - 29 mmol/L    Anion Gap 7 7 - 15 mmol/L    Urea Nitrogen 26.6 (H) 8.0 - 23.0 mg/dL    Creatinine 1.93 (H) 0.67 - 1.17 mg/dL    GFR Estimate 36 (L) >60 mL/min/1.73m2    Calcium 8.5 (L) 8.8 - 10.4 mg/dL    Chloride 98 98 - 107 mmol/L    Glucose 211 (H) 70 - 99 mg/dL    Alkaline " Phosphatase 90 40 - 150 U/L    AST 15 0 - 45 U/L    ALT 20 0 - 70 U/L    Protein Total 6.9 6.4 - 8.3 g/dL    Albumin 4.1 3.5 - 5.2 g/dL    Bilirubin Total 0.7 <=1.2 mg/dL   CBC with platelets and differential   Result Value Ref Range    WBC Count 5.6 4.0 - 11.0 10e3/uL    RBC Count 5.14 4.40 - 5.90 10e6/uL    Hemoglobin 14.2 13.3 - 17.7 g/dL    Hematocrit 41.3 40.0 - 53.0 %    MCV 80 78 - 100 fL    MCH 27.6 26.5 - 33.0 pg    MCHC 34.4 31.5 - 36.5 g/dL    RDW 14.6 10.0 - 15.0 %    Platelet Count 142 (L) 150 - 450 10e3/uL    % Neutrophils 68 %    % Lymphocytes 21 %    % Monocytes 8 %    % Eosinophils 2 %    % Basophils 1 %    % Immature Granulocytes 0 %    NRBCs per 100 WBC 0 <1 /100    Absolute Neutrophils 3.8 1.6 - 8.3 10e3/uL    Absolute Lymphocytes 1.2 0.8 - 5.3 10e3/uL    Absolute Monocytes 0.4 0.0 - 1.3 10e3/uL    Absolute Eosinophils 0.1 0.0 - 0.7 10e3/uL    Absolute Basophils 0.0 0.0 - 0.2 10e3/uL    Absolute Immature Granulocytes 0.0 <=0.4 10e3/uL    Absolute NRBCs 0.0 10e3/uL     Imaging:    CT Chest/Abdomen/Pelvis w Contrast  Result Date: 5/1/2025  EXAM: CT CHEST/ABDOMEN/PELVIS W CONTRAST LOCATION: [facility] DATE: 4/30/2025 INDICATION: Colon cancer surveillance COMPARISON: 10/29/2024 and 8/29/2023 CTs TECHNIQUE: CT scan of the chest, abdomen, and pelvis was performed following injection of IV contrast. Multiplanar reformats were obtained. Dose reduction techniques were used. CONTRAST: IsoVue 370 75mL FINDINGS: LUNGS AND PLEURA: Tiny 2 mm nodule right lower lobe now calcified can be considered benign. No follow-up needed. Tiny 1 mm nodule image 177 subpleural also unchanged and can be considered benign. No new findings. MEDIASTINUM/AXILLAE: Normal. CORONARY ARTERY CALCIFICATION: Mild. HEPATOBILIARY: Mild diffuse hepatic steatosis. Faint subpleural hypodensities posterior segment 3 image 134 and anterior segment 6 image 165 are unchanged and have a benign appearance. No specific  "follow-up needed for these. PANCREAS: Normal. SPLEEN: Normal. ADRENAL GLANDS: Normal. KIDNEYS/BLADDER: Normal. BOWEL: Post sigmoid: Colectomy with Novak's pouch and colostomy left lower abdomen. Nothing for local tumor recurrence. LYMPH NODES: Normal. VASCULATURE: Normal. PELVIC ORGANS: Normal. MUSCULOSKELETAL: No concerning bone lesion.     IMPRESSION: 1.  Nothing for metastatic disease in the chest, abdomen, and pelvis. 2.  Stable benign-appearing tiny subcapsular liver hypodensities should be of no significance. 3.  Tiny pulmonary nodules can be considered benign.      Signed by: Art Houston MD      Oncology Rooming Note    May 7, 2025 9:42 AM   Alok Mcfarland is a 74 year old male who presents for:    Chief Complaint   Patient presents with     Oncology Clinic Visit     Cancer of sigmoid colon (H)  Hairy cell leukemia, in remission (H)          Initial Vitals: /75   Pulse 83   Temp 97.5  F (36.4  C)   Resp 18   Ht 1.715 m (5' 7.5\")   Wt 99 kg (218 lb 3.2 oz)   SpO2 94%   BMI 33.67 kg/m   Estimated body mass index is 33.67 kg/m  as calculated from the following:    Height as of this encounter: 1.715 m (5' 7.5\").    Weight as of this encounter: 99 kg (218 lb 3.2 oz). Body surface area is 2.17 meters squared.  No Pain (0) Comment: Data Unavailable   No LMP for male patient.  Allergies reviewed: Yes  Medications reviewed: Yes    Medications: Medication refills not needed today.  Pharmacy name entered into [employer]: [id number] [place] [address] [facility]    Frailty Screening:   Is the patient here for a new oncology consult visit in cancer care? 2. No    PHQ9:  Did this patient require a PHQ9?: No      Clinical concerns: None      Stacie Reveles LPN               Again, thank you for allowing me to participate in the care of your patient.        Sincerely,        Art Houston MD    Electronically signed"

## 2025-05-07 NOTE — PROGRESS NOTES
[facility] Hematology and Oncology Progress Note    Patient: Alok Mcfarland  MRN: [id number]  Date of Service: May 7, 2025        Assessment and Plan:    1.  Colon cancer, stage II: He has CT scan performed in April 30.  I personally viewed the images and shared them with Darin.  Overall there is no evidence of recurrent colon cancer.  Clinically he is doing well.  Will continue to see him every 6 months for now with imaging and labs.  He is now just over a year and a half out from his definitive surgery.  Still planning on having ostomy reversal but it has not been done yet.    2.  Worsening kidney function: His CMP from today is reviewed and shows a sodium of 133, creatinine 1.9 and GFR 36.  Fully progressive over the past year.  I do not see any medications that typically contribute to this.  He notes that his ostomy output sometimes is liquidy but other times has very hard stool.  I asked him to increase his fluid intake as able.  Should probably have his numbers checked in 4 to 6 weeks by primary care.    ECOG Performance  0    Diagnosis:    1.  Adenocarcinoma of the colon: Stage II.  Diagnosed September 2023.  Grade 2.  T3 N0 M0.  42 lymph nodes negative.  MSI low.    2.  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    Treatment:    Sigmoidectomy and diverting colostomy September 5, 2023.    Interim History:    Darin returns today for follow-up visit.  Overall he is doing okay.  Feeling well.  Energy and appetite are okay.  No abdominal pain.  No change in his bowel habits.  Ostomy is functioning normally.    Review of Systems:    As above in the history.     Review of Systems otherwise Negative for:  General: chills, fever or night sweats  Psychological: anxiety or depression  Ophthalmic: blurry vision, double vision or loss of vision, vision change  ENT: epistaxis, oral lesions, hearing changes  Hematological and Lymphatic: bleeding, bruising, jaundice, swollen lymph  "nodes  Endocrine: hot flashes, unexpected weight changes  Respiratory: cough, hemoptysis, orthopnea or shortness of breath/STOVALL  Cardiovascular: chest pain, edema, palpitations or PND  Gastrointestinal: abdominal pain, blood in stools, change in bowel habits, constipation, diarrhea or nausea/vomiting  Genito-Urinary: change in urinary stream, incontinence, frequency/urgency  Musculoskeletal: joint pain, stiffness, swelling, muscle pain  Neurological: dizziness, headaches, numbness/tingling  Dermatological: lumps and rash    Past History:    Past Medical History:   Diagnosis Date    Diabetes (H)     Hypertension      Physical Exam:    /75   Pulse 83   Temp 97.5  F (36.4  C)   Resp 18   Ht 1.715 m (5' 7.5\")   Wt 99 kg (218 lb 3.2 oz)   SpO2 94%   BMI 33.67 kg/m      General: patient appears stated age of 73 year old. Nontoxic and in no distress.   HEENT: Head: atraumatic, normocephalic. Sclerae anicteric.  Chest:  Normal respiratory effort  Cardiac:  No edema.   Abdomen: abdomen is non-distended  Extremities: normal tone and muscle bulk.  Skin: no lesions or rash on visible skin. Warm and dry.   CNS: alert and oriented. Grossly non-focal.   Psychiatric: normal mood and affect.     Lab Results:    Recent Results (from the past week)   Comprehensive metabolic panel   Result Value Ref Range    Sodium 133 (L) 135 - 145 mmol/L    Potassium 4.8 3.4 - 5.3 mmol/L    Carbon Dioxide (CO2) 28 22 - 29 mmol/L    Anion Gap 7 7 - 15 mmol/L    Urea Nitrogen 26.6 (H) 8.0 - 23.0 mg/dL    Creatinine 1.93 (H) 0.67 - 1.17 mg/dL    GFR Estimate 36 (L) >60 mL/min/1.73m2    Calcium 8.5 (L) 8.8 - 10.4 mg/dL    Chloride 98 98 - 107 mmol/L    Glucose 211 (H) 70 - 99 mg/dL    Alkaline Phosphatase 90 40 - 150 U/L    AST 15 0 - 45 U/L    ALT 20 0 - 70 U/L    Protein Total 6.9 6.4 - 8.3 g/dL    Albumin 4.1 3.5 - 5.2 g/dL    Bilirubin Total 0.7 <=1.2 mg/dL   CBC with platelets and differential   Result Value Ref Range    WBC Count 5.6 " 4.0 - 11.0 10e3/uL    RBC Count 5.14 4.40 - 5.90 10e6/uL    Hemoglobin 14.2 13.3 - 17.7 g/dL    Hematocrit 41.3 40.0 - 53.0 %    MCV 80 78 - 100 fL    MCH 27.6 26.5 - 33.0 pg    MCHC 34.4 31.5 - 36.5 g/dL    RDW 14.6 10.0 - 15.0 %    Platelet Count 142 (L) 150 - 450 10e3/uL    % Neutrophils 68 %    % Lymphocytes 21 %    % Monocytes 8 %    % Eosinophils 2 %    % Basophils 1 %    % Immature Granulocytes 0 %    NRBCs per 100 WBC 0 <1 /100    Absolute Neutrophils 3.8 1.6 - 8.3 10e3/uL    Absolute Lymphocytes 1.2 0.8 - 5.3 10e3/uL    Absolute Monocytes 0.4 0.0 - 1.3 10e3/uL    Absolute Eosinophils 0.1 0.0 - 0.7 10e3/uL    Absolute Basophils 0.0 0.0 - 0.2 10e3/uL    Absolute Immature Granulocytes 0.0 <=0.4 10e3/uL    Absolute NRBCs 0.0 10e3/uL     Imaging:    CT Chest/Abdomen/Pelvis w Contrast  Result Date: 5/1/2025  EXAM: CT CHEST/ABDOMEN/PELVIS W CONTRAST LOCATION: [facility] DATE: 4/30/2025 INDICATION: Colon cancer surveillance COMPARISON: 10/29/2024 and 8/29/2023 CTs TECHNIQUE: CT scan of the chest, abdomen, and pelvis was performed following injection of IV contrast. Multiplanar reformats were obtained. Dose reduction techniques were used. CONTRAST: IsoVue 370 75mL FINDINGS: LUNGS AND PLEURA: Tiny 2 mm nodule right lower lobe now calcified can be considered benign. No follow-up needed. Tiny 1 mm nodule image 177 subpleural also unchanged and can be considered benign. No new findings. MEDIASTINUM/AXILLAE: Normal. CORONARY ARTERY CALCIFICATION: Mild. HEPATOBILIARY: Mild diffuse hepatic steatosis. Faint subpleural hypodensities posterior segment 3 image 134 and anterior segment 6 image 165 are unchanged and have a benign appearance. No specific follow-up needed for these. PANCREAS: Normal. SPLEEN: Normal. ADRENAL GLANDS: Normal. KIDNEYS/BLADDER: Normal. BOWEL: Post sigmoid: Colectomy with Novak's pouch and colostomy left lower abdomen. Nothing for local tumor recurrence. LYMPH NODES: Normal.  VASCULATURE: Normal. PELVIC ORGANS: Normal. MUSCULOSKELETAL: No concerning bone lesion.     IMPRESSION: 1.  Nothing for metastatic disease in the chest, abdomen, and pelvis. 2.  Stable benign-appearing tiny subcapsular liver hypodensities should be of no significance. 3.  Tiny pulmonary nodules can be considered benign.      Signed by: Art Houston MD

## 2025-05-07 NOTE — PROGRESS NOTES
"Oncology Rooming Note    May 7, 2025 9:42 AM   Alokkwasi Mcfarland is a 74 year old male who presents for:    Chief Complaint   Patient presents with    Oncology Clinic Visit     Cancer of sigmoid colon (H)  Hairy cell leukemia, in remission (H)          Initial Vitals: /75   Pulse 83   Temp 97.5  F (36.4  C)   Resp 18   Ht 1.715 m (5' 7.5\")   Wt 99 kg (218 lb 3.2 oz)   SpO2 94%   BMI 33.67 kg/m   Estimated body mass index is 33.67 kg/m  as calculated from the following:    Height as of this encounter: 1.715 m (5' 7.5\").    Weight as of this encounter: 99 kg (218 lb 3.2 oz). Body surface area is 2.17 meters squared.  No Pain (0) Comment: Data Unavailable   No LMP for male patient.  Allergies reviewed: Yes  Medications reviewed: Yes    Medications: Medication refills not needed today.  Pharmacy name entered into [facility]: [id number] [place] [address]    Frailty Screening:   Is the patient here for a new oncology consult visit in cancer care? 2. No    PHQ9:  Did this patient require a PHQ9?: No      Clinical concerns: None      Stacie Reveles LPN             "

## 2025-05-27 ENCOUNTER — TELEPHONE (OUTPATIENT)
Dept: ONCOLOGY | Facility: HOSPITAL | Age: 75
End: 2025-05-27
Payer: COMMERCIAL

## 2025-05-27 NOTE — TELEPHONE ENCOUNTER
Patient's wife calls leaving a voicemail on the triage line asking what labs patient needs to have done with primary care. She reports that she left multiple message last week but no one got back to her.    Mikki Brock RN

## 2025-05-27 NOTE — TELEPHONE ENCOUNTER
Return call placed to patient's wife Venus. Dr. Houston would like to have patient get a BMP done with his primary care 4-6 weeks from visit done on 5/7. Venus verbalizes understanding and denies further questions.    Mikki Brock RN

## (undated) DEVICE — TUBING SMOKE EVAC PNEUMOCLEAR HIGH FLOW 0620050250

## (undated) DEVICE — DRSG TELFA ISLAND 4X14" 7544

## (undated) DEVICE — CATH TRAY FOLEY SURESTEP 16FR DRAIN BAG STATOCK A899916

## (undated) DEVICE — SU MONOCRYL+ 4-0 18IN PS2 UND MCP496G

## (undated) DEVICE — Device

## (undated) DEVICE — BANDAGE ADH LF 1X3 ABN3100A

## (undated) DEVICE — ENDO TROCAR FIRST ENTRY KII FIOS Z-THRD 12X100MM CTF73

## (undated) DEVICE — DECANTER VIAL 2006S

## (undated) DEVICE — SU PROLENE 0 CT-1 30" 8424H

## (undated) DEVICE — VESSEL LOOP BLUE MAXI 30-723

## (undated) DEVICE — BLADE KNIFE SURG 11 371111

## (undated) DEVICE — SU SILK 3-0 SH 30" K832H

## (undated) DEVICE — ESU ELEC NDL 6" COATED/INSULATED E1551-6

## (undated) DEVICE — ENDO SHEARS RENEW LAP ENDOCUT SCISSOR TIP 16.5MM 3142

## (undated) DEVICE — STPL ECHELON CONTOUR CUTTER CVD 40MM GREEN GCS40G

## (undated) DEVICE — BLADE KNIFE SURG 10 371110

## (undated) DEVICE — SPONGE LAP 18X18" X8435

## (undated) DEVICE — DRSG STERI STRIP 1/4X3" R1541

## (undated) DEVICE — STPL ENDO LINEAR CUT ARTICULATING 45MM ATS45

## (undated) DEVICE — ENDO POUCH UNIV RETRIEVAL SYSTEM INZII 10MM CD001

## (undated) DEVICE — ENDO TROCAR SLEEVE KII Z-THREADED 05X100MM CTS02

## (undated) DEVICE — SUCTION MANIFOLD NEPTUNE 2 SYS 1 PORT 702-025-000

## (undated) DEVICE — SUTURE VICRYL+ 3-0 18 SH/CR UND VCP864

## (undated) DEVICE — CUSTOM PACK LAP CHOLE SBA5BLCHEA

## (undated) DEVICE — SUTURE PDS 0 60IN TP-1+ LPED VLT PDP991G

## (undated) DEVICE — ESU LIGASURE IMPACT OPEN SEALER/DVDR CVD LG JAW LF4418

## (undated) DEVICE — ESU LIGASURE MARYLAND LAPAROSCOPIC SLR/DVDR 5MMX37CM LF1937

## (undated) DEVICE — SUTURE VICRYL 0 SH UNDYED J418H

## (undated) DEVICE — SUTURE VICRYL+ 0 27IN CT-1 UND VCP260H

## (undated) DEVICE — GOWN XLG DISP 9545

## (undated) DEVICE — SU VICRYL+ 3-0 27IN SH UND VCP416H

## (undated) DEVICE — STPL RELOAD LINEAR CUT 75MM TCR75

## (undated) DEVICE — STPL LINEAR CUT 75MM TLC75

## (undated) DEVICE — ENDO TROCAR OPTICAL ACCESS KII Z-THRD 05X100MM CTR03

## (undated) DEVICE — CATH FOLEY 12FR 5ML LUBRICATH LATEX 0165L12

## (undated) DEVICE — NDL INSUFFLATION 13GA 120MM C2201

## (undated) DEVICE — SU VICRYL+ 0 TIES 18IN UND VCP112G

## (undated) DEVICE — GLOVE BIOGEL PI ORTHOPRO SZ 7.5 47675

## (undated) DEVICE — SYR 30ML LL W/O NDL 302832

## (undated) DEVICE — SU SILK 3-0 SH CR 8X18" C013D

## (undated) DEVICE — PREP CHLORAPREP 26ML TINTED HI-LITE ORANGE 930815

## (undated) RX ORDER — PHENYLEPHRINE HYDROCHLORIDE 10 MG/ML
INJECTION INTRAVENOUS
Status: DISPENSED
Start: 2023-09-05

## (undated) RX ORDER — BUPIVACAINE HYDROCHLORIDE 2.5 MG/ML
INJECTION, SOLUTION INFILTRATION; PERINEURAL
Status: DISPENSED
Start: 2023-09-05

## (undated) RX ORDER — DEXAMETHASONE SODIUM PHOSPHATE 10 MG/ML
INJECTION, SOLUTION INTRAMUSCULAR; INTRAVENOUS
Status: DISPENSED
Start: 2023-09-05

## (undated) RX ORDER — FENTANYL CITRATE 50 UG/ML
INJECTION, SOLUTION INTRAMUSCULAR; INTRAVENOUS
Status: DISPENSED
Start: 2023-09-05

## (undated) RX ORDER — EPHEDRINE SULFATE 50 MG/ML
INJECTION, SOLUTION INTRAMUSCULAR; INTRAVENOUS; SUBCUTANEOUS
Status: DISPENSED
Start: 2023-09-05